# Patient Record
Sex: FEMALE | Race: WHITE | NOT HISPANIC OR LATINO | Employment: OTHER | URBAN - NONMETROPOLITAN AREA
[De-identification: names, ages, dates, MRNs, and addresses within clinical notes are randomized per-mention and may not be internally consistent; named-entity substitution may affect disease eponyms.]

---

## 2019-08-20 ENCOUNTER — OFFICE VISIT (OUTPATIENT)
Dept: CARDIOLOGY | Facility: CLINIC | Age: 74
End: 2019-08-20

## 2019-08-20 ENCOUNTER — HOSPITAL ENCOUNTER (OUTPATIENT)
Dept: RESPIRATORY THERAPY | Facility: HOSPITAL | Age: 74
Discharge: HOME | End: 2019-08-20
Admitting: INTERNAL MEDICINE

## 2019-08-20 VITALS
WEIGHT: 151 LBS | SYSTOLIC BLOOD PRESSURE: 112 MMHG | HEART RATE: 62 BPM | HEIGHT: 62 IN | BODY MASS INDEX: 27.79 KG/M2 | DIASTOLIC BLOOD PRESSURE: 70 MMHG | RESPIRATION RATE: 16 BRPM

## 2019-08-20 DIAGNOSIS — R06.09 DYSPNEA ON EXERTION: ICD-10-CM

## 2019-08-20 DIAGNOSIS — R00.1 BRADYCARDIA, SINUS: Primary | ICD-10-CM

## 2019-08-20 DIAGNOSIS — R00.1 BRADYCARDIA, SINUS: ICD-10-CM

## 2019-08-20 PROCEDURE — 93000 ELECTROCARDIOGRAM COMPLETE: CPT | Performed by: INTERNAL MEDICINE

## 2019-08-20 PROCEDURE — 93226 XTRNL ECG REC<48 HR SCAN A/R: CPT

## 2019-08-20 PROCEDURE — 99204 OFFICE O/P NEW MOD 45 MIN: CPT | Performed by: INTERNAL MEDICINE

## 2019-08-20 PROCEDURE — 93225 XTRNL ECG REC<48 HRS REC: CPT

## 2019-08-20 RX ORDER — SERTRALINE HYDROCHLORIDE 100 MG/1
100 TABLET, FILM COATED ORAL DAILY
COMMUNITY

## 2019-08-20 RX ORDER — CARVEDILOL 25 MG/1
12.5 TABLET ORAL 2 TIMES DAILY WITH MEALS
Status: SHIPPED | COMMUNITY
Start: 2019-08-20 | End: 2020-01-09 | Stop reason: ALTCHOICE

## 2019-08-20 RX ORDER — CARVEDILOL 25 MG/1
25 TABLET ORAL 2 TIMES DAILY WITH MEALS
COMMUNITY
End: 2019-08-20

## 2019-08-20 RX ORDER — AMLODIPINE BESYLATE 5 MG/1
5 TABLET ORAL DAILY
COMMUNITY
End: 2019-09-11 | Stop reason: SDUPTHER

## 2019-08-20 RX ORDER — ATORVASTATIN CALCIUM 20 MG/1
20 TABLET, FILM COATED ORAL DAILY
COMMUNITY
End: 2023-04-03 | Stop reason: SDUPTHER

## 2019-08-20 RX ORDER — NIACIN 500 MG
500 TABLET ORAL NIGHTLY
Status: ON HOLD | COMMUNITY
End: 2023-03-14

## 2019-08-20 RX ORDER — DEXLANSOPRAZOLE 60 MG/1
60 CAPSULE, DELAYED RELEASE ORAL DAILY
Status: ON HOLD | COMMUNITY
End: 2023-03-18 | Stop reason: SDUPTHER

## 2019-08-20 RX ORDER — NITROGLYCERIN 0.4 MG/1
0.4 TABLET SUBLINGUAL
COMMUNITY

## 2019-08-20 RX ORDER — LORATADINE 10 MG/1
10 TABLET ORAL DAILY
COMMUNITY

## 2019-08-20 RX ORDER — IBUPROFEN 800 MG/1
800 TABLET ORAL EVERY 8 HOURS PRN
COMMUNITY
End: 2019-12-03 | Stop reason: HOSPADM

## 2019-08-20 RX ORDER — HYDROCODONE BITARTRATE AND ACETAMINOPHEN 10; 325 MG/1; MG/1
1 TABLET ORAL 3 TIMES DAILY
COMMUNITY
End: 2021-03-10

## 2019-08-20 RX ORDER — OMEGA-3 FATTY ACIDS/FISH OIL 300-1000MG
1000 CAPSULE ORAL DAILY
COMMUNITY

## 2019-08-20 RX ORDER — AMITRIPTYLINE HYDROCHLORIDE 100 MG/1
100 TABLET, FILM COATED ORAL NIGHTLY
Status: ON HOLD | COMMUNITY
End: 2019-12-03 | Stop reason: SDUPTHER

## 2019-08-20 RX ORDER — ASPIRIN 81 MG/1
81 TABLET ORAL 2 TIMES DAILY
COMMUNITY
End: 2019-12-03 | Stop reason: HOSPADM

## 2019-08-20 NOTE — PROGRESS NOTES
Kreis, Samuel Duane, MD  Layne Rodriguez  1945 08/20/2019    Patient Active Problem List   Diagnosis   • Bradycardia, sinus   • Dyspnea on exertion (NYHA class III)       Dear Kreis, Samuel Duane, MD:    Subjective     Layne Rodriguez is a 74 y.o. female with the problems as listed above, presents with    Chief complaint: To establish cardiac care and follow-up through our office in a patient with history of pacemaker implantation and questionable history of coronary artery disease.    History of Present Illness: Ms. Rodriguez is a pleasant 74-year-old  female with history of permanent pacemaker implantation in October 2009 by Dr. Tijerina at [facility] with a [employer] device.  She has not had a follow-up here since June 2013.  She apparently was living in [place] and had her audiology follow-up through a local cardiologist there.  She recently had cardiac catheterization that apparently revealed no significant coronary artery disease.  She brought pictures of coronary angiographic images which did not seem to reveal any significant coronary artery disease.  Her pacemaker was interrogated in our office today and it reveals essentially a dead battery.  On further questioning patient denies any complaints of syncope or near syncope in the recent or remote past.  She has some intermittent dizziness which she has had for many years and reportedly has not gotten any better after the pacemaker implantation.  She has some intermittent chest pains and jaw pains which she says has had for a long time.  She has dyspnea with mild exertion with no PND, but has 2 pillow orthopnea and mild bilateral leg edema.    Allergies   Allergen Reactions   • Codeine Other (See Comments)     headaches   • Reglan [Metoclopramide] Unknown (See Comments)     Eyes twitch/involuntary muscle movement   :      Current Outpatient Medications:   •  amitriptyline (ELAVIL) 100 MG tablet, Take 100 mg by mouth Every Night.,  Disp: , Rfl:   •  amLODIPine (NORVASC) 5 MG tablet, Take 5 mg by mouth Daily., Disp: , Rfl:   •  aspirin 81 MG EC tablet, Take 81 mg by mouth 2 (Two) Times a Day., Disp: , Rfl:   •  atorvastatin (LIPITOR) 20 MG tablet, Take 20 mg by mouth Daily., Disp: , Rfl:   •  carvedilol (COREG) 25 MG tablet, Take 0.5 tablets by mouth 2 (Two) Times a Day With Meals., Disp: , Rfl:   •  dexlansoprazole (DEXILANT) 60 MG capsule, Take 60 mg by mouth Daily., Disp: , Rfl:   •  HYDROcodone-acetaminophen (NORCO)  MG per tablet, Take 1 tablet by mouth 3 (Three) Times a Day., Disp: , Rfl:   •  loratadine (CLARITIN) 10 MG tablet, Take 10 mg by mouth Daily., Disp: , Rfl:   •  niacin 500 MG tablet, Take 500 mg by mouth Every Night., Disp: , Rfl:   •  nitroglycerin (NITROSTAT) 0.4 MG SL tablet, Place 0.4 mg under the tongue Every 5 (Five) Minutes As Needed for Chest Pain. Take no more than 3 doses in 15 minutes., Disp: , Rfl:   •  Omega 3 1000 MG capsule, Take  by mouth Daily., Disp: , Rfl:   •  sertraline (ZOLOFT) 100 MG tablet, Take 100 mg by mouth Daily., Disp: , Rfl:   •  VITAMIN B1-B12 IM, Inject  into the appropriate muscle as directed by prescriber Every 30 (Thirty) Days., Disp: , Rfl:   •  ibuprofen (ADVIL,MOTRIN) 800 MG tablet, Take 800 mg by mouth Every 8 (Eight) Hours As Needed., Disp: , Rfl:     Past Medical History:   Diagnosis Date   • Anemia    • Arthritis    • ASCVD (arteriosclerotic cardiovascular disease)    • Monae's esophagus    • COPD (chronic obstructive pulmonary disease) (CMS/HCC)    • Coronary artery disease    • Dyslipidemia    • Essential hypertension    • GERD (gastroesophageal reflux disease)    • Myocardial infarction (CMS/HCC)    • Osteoporosis      Past Surgical History:   Procedure Laterality Date   • BACK SURGERY     • CORONARY STENT PLACEMENT      x 3   • HYSTERECTOMY     • INSERT / REPLACE / REMOVE PACEMAKER     • PACEMAKER IMPLANTATION       Family History   Problem Relation Age of Onset   • Stroke  "Mother    • Heart disease Mother    • Stroke Father    • Hypertension Brother    • Hypertension Sister    • Stroke Sister    • Hypertension Sister    • Hypertension Sister    • Hypertension Sister    • Hypertension Brother    • Hypertension Brother    • Hypertension Brother      Social History     Tobacco Use   • Smoking status: Never Smoker   • Smokeless tobacco: Never Used   Substance Use Topics   • Alcohol use: No     Frequency: Never   • Drug use: No       Review of Systems   Constitution: Negative for chills, diaphoresis and fever.   Eyes: Positive for visual disturbance. Negative for redness.   Cardiovascular: Positive for chest pain, leg swelling and palpitations. Negative for orthopnea and paroxysmal nocturnal dyspnea.   Respiratory: Positive for shortness of breath. Negative for cough and hemoptysis.    Endocrine: Negative for cold intolerance and heat intolerance.   Hematologic/Lymphatic: Bruises/bleeds easily.        Hx anemia   Skin: Negative for itching and rash.   Musculoskeletal: Positive for joint pain, muscle weakness and stiffness. Negative for myalgias.   Gastrointestinal: Positive for abdominal pain and heartburn. Negative for constipation, diarrhea, nausea and vomiting.   Genitourinary: Negative for dysuria and hematuria.   Neurological: Positive for dizziness, headaches, light-headedness, numbness, paresthesias and tremors. Negative for focal weakness.   Psychiatric/Behavioral: Positive for depression. The patient is nervous/anxious.    Allergic/Immunologic: Negative.    All other systems reviewed and are negative.      Objective   Blood pressure 112/70, pulse 62, resp. rate 16, height 157.5 cm (62\"), weight 68.5 kg (151 lb).  Body mass index is 27.62 kg/m².        Physical Exam   Constitutional: She is oriented to person, place, and time. She appears well-developed and well-nourished.   HENT:   Mouth/Throat: Oropharynx is clear and moist.   Eyes: EOM are normal. Pupils are equal, round, and " reactive to light.   Neck: Neck supple. No JVD present. No tracheal deviation present. No thyromegaly present.   Cardiovascular: Normal rate, regular rhythm, S1 normal and S2 normal. Exam reveals no gallop, no S3 and no friction rub.   No murmur heard.  Pulses:       Dorsalis pedis pulses are 1+ on the right side, and 1+ on the left side.        Posterior tibial pulses are 2+ on the right side, and 2+ on the left side.   Pulmonary/Chest: Effort normal and breath sounds normal.   Abdominal: Soft. Bowel sounds are normal. She exhibits no mass. There is no tenderness.   Musculoskeletal: Normal range of motion. She exhibits no edema.   Lymphadenopathy:     She has no cervical adenopathy.   Neurological: She is alert and oriented to person, place, and time.   Skin: Skin is warm and dry. No rash noted.   Psychiatric: She has a normal mood and affect.         During this visit the following were done:  Labs Reviewed [x]    Radiology Images Reviewed [x]          ECG 12 Lead  Date/Time: 8/20/2019 2:52 PM  Performed by: Flako Pavon MD  Authorized by: Flako Pavon MD   Previous ECG: no previous ECG available  Rhythm: sinus bradycardia  BPM: 58  Conduction: conduction normal  ST Segments: ST segments normal  Other findings: non-specific ST-T wave changes              Assessment/Plan :   Diagnosis Plan   1. Bradycardia, sinus  Holter Monitor - 48 Hour   2. Dyspnea on exertion (NYHA class III)  Adult Transthoracic Echo Complete      Recommendations:    Orders Placed This Encounter   Procedures   • Holter Monitor - 48 Hour   • Adult Transthoracic Echo Complete W/ Cont if Necessary Per Protocol        1. We will obtain 48-hour Holter monitor to rule out any recurrent significant bradycardia arrhythmias to see if she would require pacemaker battery replacement.  2. Will decrease the dose of carvedilol to 12.5 mg p.o. twice daily to avoid any significant bradycardia.  I told Ms. Rodriguez to take half of 25 mg tablet of carvedilol  twice a day.  She expressed understanding.  3. Due to her class III dyspnea obtain echo Doppler study to evaluate her LV systolic and diastolic function and tailor further therapy accordingly.    Return in about 3 weeks (around 9/10/2019) for or sooner if needed.    As always, ,   I appreciate very much the opportunity to participate in the cardiovascular care of your patients. Please do not hesitate to call me with any questions with regards to Layne Rodriguez's evaluation and management.       With Best Regards,        Flako Pavon MD, FACC    Please note that portions of this note were completed with a voice recognition program.

## 2019-08-22 ENCOUNTER — APPOINTMENT (OUTPATIENT)
Dept: CARDIOLOGY | Facility: HOSPITAL | Age: 74
End: 2019-08-22

## 2019-08-23 ENCOUNTER — HOSPITAL ENCOUNTER (OUTPATIENT)
Dept: CARDIOLOGY | Facility: HOSPITAL | Age: 74
Discharge: HOME | End: 2019-08-23
Admitting: INTERNAL MEDICINE

## 2019-08-23 DIAGNOSIS — R06.09 DYSPNEA ON EXERTION: ICD-10-CM

## 2019-08-23 LAB
BH CV ECHO MEAS - ACS: 2.2 CM
BH CV ECHO MEAS - AO ROOT AREA (BSA CORRECTED): 1.9
BH CV ECHO MEAS - AO ROOT AREA: 8.3 CM^2
BH CV ECHO MEAS - AO ROOT DIAM: 3.3 CM
BH CV ECHO MEAS - BSA(HAYCOCK): 1.8 M^2
BH CV ECHO MEAS - BSA: 1.7 M^2
BH CV ECHO MEAS - BZI_BMI: 27.6 KILOGRAMS/M^2
BH CV ECHO MEAS - BZI_METRIC_HEIGHT: 157.5 CM
BH CV ECHO MEAS - BZI_METRIC_WEIGHT: 68.5 KG
BH CV ECHO MEAS - EDV(CUBED): 55.7 ML
BH CV ECHO MEAS - EDV(MOD-SP4): 21 ML
BH CV ECHO MEAS - EDV(TEICH): 62.7 ML
BH CV ECHO MEAS - EF(CUBED): 53.9 %
BH CV ECHO MEAS - EF(MOD-SP4): 66.7 %
BH CV ECHO MEAS - EF(TEICH): 46.5 %
BH CV ECHO MEAS - ESV(CUBED): 25.7 ML
BH CV ECHO MEAS - ESV(MOD-SP4): 7 ML
BH CV ECHO MEAS - ESV(TEICH): 33.6 ML
BH CV ECHO MEAS - FS: 22.8 %
BH CV ECHO MEAS - IVS/LVPW: 1.4
BH CV ECHO MEAS - IVSD: 2 CM
BH CV ECHO MEAS - LA DIMENSION: 4.6 CM
BH CV ECHO MEAS - LA/AO: 1.4
BH CV ECHO MEAS - LV DIASTOLIC VOL/BSA (35-75): 12.4 ML/M^2
BH CV ECHO MEAS - LV MASS(C)D: 271.3 GRAMS
BH CV ECHO MEAS - LV MASS(C)DI: 159.9 GRAMS/M^2
BH CV ECHO MEAS - LV SYSTOLIC VOL/BSA (12-30): 4.1 ML/M^2
BH CV ECHO MEAS - LVIDD: 3.8 CM
BH CV ECHO MEAS - LVIDS: 3 CM
BH CV ECHO MEAS - LVLD AP4: 6.2 CM
BH CV ECHO MEAS - LVLS AP4: 5.8 CM
BH CV ECHO MEAS - LVOT AREA (M): 2.5 CM^2
BH CV ECHO MEAS - LVOT AREA: 2.5 CM^2
BH CV ECHO MEAS - LVOT DIAM: 1.8 CM
BH CV ECHO MEAS - LVPWD: 1.4 CM
BH CV ECHO MEAS - MV A MAX VEL: 149 CM/SEC
BH CV ECHO MEAS - MV E MAX VEL: 84.5 CM/SEC
BH CV ECHO MEAS - MV E/A: 0.57
BH CV ECHO MEAS - PA ACC SLOPE: 1073 CM/SEC^2
BH CV ECHO MEAS - PA ACC TIME: 0.09 SEC
BH CV ECHO MEAS - PA PR(ACCEL): 39.4 MMHG
BH CV ECHO MEAS - RVDD: 3.1 CM
BH CV ECHO MEAS - SI(CUBED): 17.7 ML/M^2
BH CV ECHO MEAS - SI(MOD-SP4): 8.3 ML/M^2
BH CV ECHO MEAS - SI(TEICH): 17.2 ML/M^2
BH CV ECHO MEAS - SV(CUBED): 30.1 ML
BH CV ECHO MEAS - SV(MOD-SP4): 14 ML
BH CV ECHO MEAS - SV(TEICH): 29.1 ML
MAXIMAL PREDICTED HEART RATE: 147 BPM
STRESS TARGET HR: 125 BPM

## 2019-08-23 PROCEDURE — 93306 TTE W/DOPPLER COMPLETE: CPT | Performed by: INTERNAL MEDICINE

## 2019-08-23 PROCEDURE — 93306 TTE W/DOPPLER COMPLETE: CPT

## 2019-08-26 PROCEDURE — 93227 XTRNL ECG REC<48 HR R&I: CPT | Performed by: INTERNAL MEDICINE

## 2019-09-11 ENCOUNTER — OFFICE VISIT (OUTPATIENT)
Dept: CARDIOLOGY | Facility: CLINIC | Age: 74
End: 2019-09-11

## 2019-09-11 VITALS
HEIGHT: 62 IN | SYSTOLIC BLOOD PRESSURE: 150 MMHG | DIASTOLIC BLOOD PRESSURE: 75 MMHG | WEIGHT: 152.4 LBS | RESPIRATION RATE: 18 BRPM | OXYGEN SATURATION: 95 % | BODY MASS INDEX: 28.05 KG/M2 | HEART RATE: 63 BPM

## 2019-09-11 DIAGNOSIS — R00.1 BRADYCARDIA, SINUS: ICD-10-CM

## 2019-09-11 DIAGNOSIS — R06.09 DYSPNEA ON EXERTION: Primary | ICD-10-CM

## 2019-09-11 DIAGNOSIS — R55 NEAR SYNCOPE: ICD-10-CM

## 2019-09-11 DIAGNOSIS — I10 ESSENTIAL HYPERTENSION: ICD-10-CM

## 2019-09-11 PROCEDURE — 99214 OFFICE O/P EST MOD 30 MIN: CPT | Performed by: NURSE PRACTITIONER

## 2019-09-11 RX ORDER — AMLODIPINE BESYLATE 10 MG/1
10 TABLET ORAL DAILY
Qty: 30 TABLET | Refills: 5 | Status: SHIPPED | OUTPATIENT
Start: 2019-09-11 | End: 2019-11-20 | Stop reason: SDUPTHER

## 2019-09-11 NOTE — PROGRESS NOTES
Kreis, Samuel Duane, MD  Layne Rodriguez  1945 09/11/2019    Patient Active Problem List   Diagnosis   • Bradycardia, sinus   • Dyspnea on exertion (NYHA class III)       Dear Kreis, Samuel Duane, MD:    Subjective     Chief Complaint   Patient presents with   • Bradycardia, sinus     3 wk f/u   • Echo results   • 48 hr Holter monitor           History of Present Illness:    Layne Rodriguez is a 74 y.o. female with a history of pacemaker placement in October 2009 by Dr. Tijerina at [facility] in [place], [place] with a [employer] device.  Her pacemaker battery has been dead for an unknown amount of time.  The patient reports she has been having near syncopal episodes and dizziness for many years.  This did not improve after pacemaker placement.  A 48-hour Holter monitor was ordered to further evaluate for any significant bradycardia.  This revealed an average heart rate of 69 bpm with a maximum heart rate of 103 bpm and minimum heart rate of 55 bpm.  She did have 2 short runs (5-6 beats) of SVT with ventricular rates up to 130 bpm.  She was taking carvedilol 25 mg twice daily during the monitoring.  She reports her dizzy spells and near syncope occurred nearly 2-3 times per day.  She is also had chronic chest pains and dyspnea for several years.  These have not recently changed.  An echocardiogram did reveal normal left ventricular cavity size with moderate septal asymmetric hypertrophy without LV outflow obstruction, and ejection fraction of 66 to 70%, and grade 1 LV diastolic dysfunction.  She has since reduced carvedilol to 12.5 mg twice daily.  Her blood pressure is mildly elevated in the office today.  She also reports a history of carotid artery disease.  She reports she did have carotid Dopplers about 6 months ago which were ordered by her previous cardiologist in [place].  She reports she was told she did have some blockages but no further treatment was indicated.  She is on aspirin and  statin therapy.  These records are not available for review.  In addition, the patient reports recent resting tremors and antalgic gait.          Allergies   Allergen Reactions   • Codeine Other (See Comments)     headaches   • Reglan [Metoclopramide] Unknown (See Comments)     Eyes twitch/involuntary muscle movement   :      Current Outpatient Medications:   •  amitriptyline (ELAVIL) 100 MG tablet, Take 100 mg by mouth Every Night., Disp: , Rfl:   •  amLODIPine (NORVASC) 10 MG tablet, Take 1 tablet by mouth Daily., Disp: 30 tablet, Rfl: 5  •  aspirin 81 MG EC tablet, Take 81 mg by mouth 2 (Two) Times a Day., Disp: , Rfl:   •  atorvastatin (LIPITOR) 20 MG tablet, Take 20 mg by mouth Daily., Disp: , Rfl:   •  carvedilol (COREG) 25 MG tablet, Take 0.5 tablets by mouth 2 (Two) Times a Day With Meals., Disp: , Rfl:   •  dexlansoprazole (DEXILANT) 60 MG capsule, Take 60 mg by mouth Daily., Disp: , Rfl:   •  HYDROcodone-acetaminophen (NORCO)  MG per tablet, Take 1 tablet by mouth 3 (Three) Times a Day., Disp: , Rfl:   •  ibuprofen (ADVIL,MOTRIN) 800 MG tablet, Take 800 mg by mouth Every 8 (Eight) Hours As Needed., Disp: , Rfl:   •  loratadine (CLARITIN) 10 MG tablet, Take 10 mg by mouth Daily., Disp: , Rfl:   •  niacin 500 MG tablet, Take 500 mg by mouth Every Night., Disp: , Rfl:   •  nitroglycerin (NITROSTAT) 0.4 MG SL tablet, Place 0.4 mg under the tongue Every 5 (Five) Minutes As Needed for Chest Pain. Take no more than 3 doses in 15 minutes., Disp: , Rfl:   •  Omega 3 1000 MG capsule, Take  by mouth Daily., Disp: , Rfl:   •  sertraline (ZOLOFT) 100 MG tablet, Take 100 mg by mouth Daily., Disp: , Rfl:   •  VITAMIN B1-B12 IM, Inject  into the appropriate muscle as directed by prescriber Every 30 (Thirty) Days., Disp: , Rfl:       The following portions of the patient's history were reviewed and updated as appropriate: allergies, current medications, past family history, past medical history, past social history,  "past surgical history and problem list.    Social History     Tobacco Use   • Smoking status: Never Smoker   • Smokeless tobacco: Never Used   Substance Use Topics   • Alcohol use: No     Frequency: Never   • Drug use: No       Review of Systems   Constitution: Negative for weakness and malaise/fatigue.   Cardiovascular: Positive for chest pain and near-syncope. Negative for dyspnea on exertion, irregular heartbeat, leg swelling, orthopnea, palpitations, paroxysmal nocturnal dyspnea and syncope.   Respiratory: Positive for shortness of breath. Negative for cough and wheezing.    Neurological: Positive for dizziness. Negative for light-headedness.       Objective   Vitals:    09/11/19 1435   BP: 150/75   BP Location: Right arm   Patient Position: Sitting   Cuff Size: Adult   Pulse: 63   Resp: 18   SpO2: 95%   Weight: 69.1 kg (152 lb 6.4 oz)   Height: 157.5 cm (62\")     Body mass index is 27.87 kg/m².        Physical Exam   Constitutional: She is oriented to person, place, and time. She appears well-developed and well-nourished.   HENT:   Head: Normocephalic and atraumatic.   Cardiovascular: Normal rate, regular rhythm and normal heart sounds. Exam reveals no S3 and no S4.   No murmur heard.  Pulmonary/Chest: Effort normal and breath sounds normal. She has no wheezes. She has no rales.   Abdominal: Soft. Bowel sounds are normal.   Musculoskeletal: She exhibits no edema.   Neurological: She is alert and oriented to person, place, and time.   Skin: Skin is warm and dry.   Psychiatric: She has a normal mood and affect. Her behavior is normal.         Procedures      Assessment/Plan    Diagnosis Plan   1. Dyspnea on exertion (NYHA class III)     2. Bradycardia, sinus  Cardiac Event Monitor   3. Essential hypertension  amLODIPine (NORVASC) 10 MG tablet   4. Near syncope  Cardiac Event Monitor                Recommendations:    1. We have discussed a pacemaker battery change. However, she wants to wait at this time since " her minimum heart rate was only 55 bpm on recent holter monitor.    2. I have ordered a 30 day event monitor to further evaluate for any tachy or shanon arrhythmias which could be causing her symptoms. If she has any significant bradycardia, she would need pacemaker battery change. She voices understanding.    3. Will request records from her previous cardiologist including recent carotid dopplers.    4. For her elevated blood pressure, will increase amlodipine to 10 mg daily.    5. Follow up in 5 weeks and PRN.    Return in about 5 weeks (around 10/16/2019) for Recheck.    As always, I appreciate very much the opportunity to participate in the cardiovascular care of your patients.      With Best Regards,    JOSUÉ Khan

## 2019-10-21 ENCOUNTER — TREATMENT (OUTPATIENT)
Dept: CARDIOLOGY | Facility: CLINIC | Age: 74
End: 2019-10-21

## 2019-10-21 DIAGNOSIS — R55 SYNCOPE AND COLLAPSE: Primary | ICD-10-CM

## 2019-10-21 PROCEDURE — 93228 REMOTE 30 DAY ECG REV/REPORT: CPT | Performed by: INTERNAL MEDICINE

## 2019-11-08 DIAGNOSIS — R55 NEAR SYNCOPE: ICD-10-CM

## 2019-11-08 DIAGNOSIS — R00.1 BRADYCARDIA, SINUS: ICD-10-CM

## 2019-11-20 ENCOUNTER — OFFICE VISIT (OUTPATIENT)
Dept: CARDIOLOGY | Facility: CLINIC | Age: 74
End: 2019-11-20

## 2019-11-20 ENCOUNTER — LAB (OUTPATIENT)
Dept: LAB | Facility: HOSPITAL | Age: 74
End: 2019-11-20

## 2019-11-20 VITALS
BODY MASS INDEX: 27.6 KG/M2 | RESPIRATION RATE: 16 BRPM | SYSTOLIC BLOOD PRESSURE: 122 MMHG | DIASTOLIC BLOOD PRESSURE: 77 MMHG | WEIGHT: 150 LBS | HEIGHT: 62 IN | HEART RATE: 75 BPM

## 2019-11-20 DIAGNOSIS — R00.0 WIDE-COMPLEX TACHYCARDIA: ICD-10-CM

## 2019-11-20 DIAGNOSIS — I10 ESSENTIAL HYPERTENSION: Primary | ICD-10-CM

## 2019-11-20 DIAGNOSIS — I10 ESSENTIAL HYPERTENSION: ICD-10-CM

## 2019-11-20 DIAGNOSIS — Z45.010 PACEMAKER GENERATOR END OF LIFE: ICD-10-CM

## 2019-11-20 DIAGNOSIS — I49.5 TACHY-BRADY SYNDROME (HCC): ICD-10-CM

## 2019-11-20 PROCEDURE — 83735 ASSAY OF MAGNESIUM: CPT

## 2019-11-20 PROCEDURE — 80048 BASIC METABOLIC PNL TOTAL CA: CPT

## 2019-11-20 PROCEDURE — 99214 OFFICE O/P EST MOD 30 MIN: CPT | Performed by: NURSE PRACTITIONER

## 2019-11-20 PROCEDURE — 36415 COLL VENOUS BLD VENIPUNCTURE: CPT

## 2019-11-20 RX ORDER — AMLODIPINE BESYLATE 10 MG/1
10 TABLET ORAL DAILY
Qty: 30 TABLET | Refills: 5 | Status: ON HOLD | OUTPATIENT
Start: 2019-11-20 | End: 2023-03-14

## 2019-11-20 NOTE — PROGRESS NOTES
Kreis, Samuel Duane, MD  Layne Rodriguez  1945 11/20/2019    Patient Active Problem List   Diagnosis   • Bradycardia, sinus   • Dyspnea on exertion (NYHA class III)       Dear Kreis, Samuel Duane, MD:    Subjective     Chief Complaint   Patient presents with   • Shortness of Breath   • Slow Heart Rate           History of Present Illness:    Layne Rodriguez is a 74 y.o. female with a history of pacemaker placement in October 2009 by Dr. Tijerina at [facility] in [place], [place] with a [employer] device.  Her pacemaker battery has been dead for an unknown amount.  The patient initially presented with episodes of near syncope and dizziness which has been recurrent for several years.  A 48-hour Holter monitor was ordered to further evaluate for any significant bradycardia.  This revealed an average heart rate of 69 bpm with a maximum heart rate of 103 bpm and a minimum heart rate of 55 bpm there were 2 short runs of SVT noted as well.  The patient was taking carvedilol during this time.  She was then evaluated further with a 30-day event monitor.  This revealed some episodes of sinus bradycardia down to 50 bpm.  Maximum heart rate was 167 bpm.  The patient did have short runs of wide-complex tachycardia.  She was taking carvedilol 12.5 mg twice daily during this time.  She continues to have episodes of dizziness, lightheadedness, and near syncope.  She denies any syncope.  She denies chest pain and shortness of breath.  She did have a cardiac catheterization in February 2019 in [place], [place].  This revealed nonobstructive coronary artery disease with high-grade stenosis in the small vessels which was not amenable to intervention.          Allergies   Allergen Reactions   • Codeine Other (See Comments)     headaches   • Reglan [Metoclopramide] Unknown (See Comments)     Eyes twitch/involuntary muscle movement   :      Current Outpatient Medications:   •  amitriptyline (ELAVIL) 100 MG tablet, Take  100 mg by mouth Every Night., Disp: , Rfl:   •  amLODIPine (NORVASC) 10 MG tablet, Take 1 tablet by mouth Daily., Disp: 30 tablet, Rfl: 5  •  aspirin 81 MG EC tablet, Take 81 mg by mouth 2 (Two) Times a Day., Disp: , Rfl:   •  atorvastatin (LIPITOR) 20 MG tablet, Take 20 mg by mouth Daily., Disp: , Rfl:   •  carvedilol (COREG) 25 MG tablet, Take 0.5 tablets by mouth 2 (Two) Times a Day With Meals., Disp: , Rfl:   •  dexlansoprazole (DEXILANT) 60 MG capsule, Take 60 mg by mouth Daily., Disp: , Rfl:   •  HYDROcodone-acetaminophen (NORCO)  MG per tablet, Take 1 tablet by mouth 3 (Three) Times a Day., Disp: , Rfl:   •  ibuprofen (ADVIL,MOTRIN) 800 MG tablet, Take 800 mg by mouth Every 8 (Eight) Hours As Needed., Disp: , Rfl:   •  loratadine (CLARITIN) 10 MG tablet, Take 10 mg by mouth Daily., Disp: , Rfl:   •  niacin 500 MG tablet, Take 500 mg by mouth Every Night., Disp: , Rfl:   •  nitroglycerin (NITROSTAT) 0.4 MG SL tablet, Place 0.4 mg under the tongue Every 5 (Five) Minutes As Needed for Chest Pain. Take no more than 3 doses in 15 minutes., Disp: , Rfl:   •  Omega 3 1000 MG capsule, Take  by mouth Daily., Disp: , Rfl:   •  sertraline (ZOLOFT) 100 MG tablet, Take 100 mg by mouth Daily., Disp: , Rfl:   •  VITAMIN B1-B12 IM, Inject  into the appropriate muscle as directed by prescriber Every 30 (Thirty) Days., Disp: , Rfl:       The following portions of the patient's history were reviewed and updated as appropriate: allergies, current medications, past family history, past medical history, past social history, past surgical history and problem list.    Social History     Tobacco Use   • Smoking status: Never Smoker   • Smokeless tobacco: Never Used   Substance Use Topics   • Alcohol use: No     Frequency: Never   • Drug use: No       Review of Systems   Constitution: Negative for weakness and malaise/fatigue.   Cardiovascular: Positive for near-syncope. Negative for chest pain, dyspnea on exertion, irregular  "heartbeat, leg swelling, orthopnea, palpitations, paroxysmal nocturnal dyspnea and syncope.   Respiratory: Negative for cough, shortness of breath and wheezing.    Neurological: Positive for dizziness and light-headedness.       Objective   Vitals:    11/20/19 1426   BP: 122/77   BP Location: Right arm   Pulse: 75   Resp: 16   Weight: 68 kg (150 lb)   Height: 157.5 cm (62.01\")     Body mass index is 27.43 kg/m².        Physical Exam   Constitutional: She is oriented to person, place, and time. She appears well-developed and well-nourished.   HENT:   Head: Normocephalic and atraumatic.   Cardiovascular: Normal rate, regular rhythm and normal heart sounds. Exam reveals no S3 and no S4.   No murmur heard.  Pulmonary/Chest: Effort normal and breath sounds normal. She has no wheezes. She has no rales.   Abdominal: Soft. Bowel sounds are normal.   Musculoskeletal: She exhibits no edema.   Neurological: She is alert and oriented to person, place, and time.   Skin: Skin is warm and dry.   Psychiatric: She has a normal mood and affect. Her behavior is normal.             Procedures      Assessment/Plan    Diagnosis Plan   1. Essential hypertension  amLODIPine (NORVASC) 10 MG tablet    Magnesium    Basic Metabolic Panel   2. Tachy-shanon syndrome (CMS/HCC)  Magnesium    Basic Metabolic Panel   3. Wide-complex tachycardia (CMS/HCC)  Magnesium    Basic Metabolic Panel   4. Pacemaker generator end of life                  Recommendations:    1. I have discussed her plan of care with Dr. Pavon.  He has recommended replacing the pacemaker battery in order to effectively treat the wide-complex tachycardia without causing significant bradycardia.  Patient is agreeable to this.  We have discussed risk and benefit. We will try to schedule this for 12/3/19.    2.  I have ordered a BMP and magnesium to be done today.    3.  I have again requested records from her prior cardiologist including carotid Doppler recently performed.    4.  " Follow-up in 4 weeks and if needed.      Return in about 4 weeks (around 12/18/2019) for Recheck.    As always, I appreciate very much the opportunity to participate in the cardiovascular care of your patients.      With Best Regards,    JOSUÉ Khan

## 2019-11-21 LAB
ANION GAP SERPL CALCULATED.3IONS-SCNC: 13 MMOL/L (ref 5–15)
BUN BLD-MCNC: 24 MG/DL (ref 8–23)
BUN/CREAT SERPL: 31.2 (ref 7–25)
CALCIUM SPEC-SCNC: 10 MG/DL (ref 8.6–10.5)
CHLORIDE SERPL-SCNC: 102 MMOL/L (ref 98–107)
CO2 SERPL-SCNC: 28 MMOL/L (ref 22–29)
CREAT BLD-MCNC: 0.77 MG/DL (ref 0.57–1)
GFR SERPL CREATININE-BSD FRML MDRD: 73 ML/MIN/1.73
GLUCOSE BLD-MCNC: 91 MG/DL (ref 65–99)
MAGNESIUM SERPL-MCNC: 2 MG/DL (ref 1.6–2.4)
POTASSIUM BLD-SCNC: 4.6 MMOL/L (ref 3.5–5.2)
SODIUM BLD-SCNC: 143 MMOL/L (ref 136–145)

## 2019-11-26 ENCOUNTER — PREP FOR SURGERY (OUTPATIENT)
Dept: OTHER | Facility: HOSPITAL | Age: 74
End: 2019-11-26

## 2019-11-26 ENCOUNTER — TELEPHONE (OUTPATIENT)
Dept: CARDIOLOGY | Facility: CLINIC | Age: 74
End: 2019-11-26

## 2019-11-26 DIAGNOSIS — I49.5 SICK SINUS SYNDROME (HCC): Primary | ICD-10-CM

## 2019-11-26 RX ORDER — SODIUM CHLORIDE 0.9 % (FLUSH) 0.9 %
3 SYRINGE (ML) INJECTION EVERY 12 HOURS SCHEDULED
Status: CANCELLED | OUTPATIENT
Start: 2019-11-26

## 2019-11-26 RX ORDER — SODIUM CHLORIDE 0.9 % (FLUSH) 0.9 %
10 SYRINGE (ML) INJECTION AS NEEDED
Status: CANCELLED | OUTPATIENT
Start: 2019-11-26

## 2019-11-26 RX ORDER — ACETAMINOPHEN 325 MG/1
650 TABLET ORAL EVERY 4 HOURS PRN
Status: CANCELLED | OUTPATIENT
Start: 2019-11-26

## 2019-11-26 NOTE — TELEPHONE ENCOUNTER
Result Notes for Basic Metabolic Panel     Notes recorded by Izabela Sun APRN on 11/21/2019 at 1:22 PM EST  Please let her know labs are fine.     Gave pt Izabela's message. PT v/u and thanked us for calling.

## 2019-12-02 PROBLEM — I49.5 SICK SINUS SYNDROME: Status: ACTIVE | Noted: 2019-12-02

## 2019-12-03 ENCOUNTER — HOSPITAL ENCOUNTER (OUTPATIENT)
Facility: HOSPITAL | Age: 74
Setting detail: HOSPITAL OUTPATIENT SURGERY
Discharge: HOME | End: 2019-12-03
Attending: INTERNAL MEDICINE | Admitting: INTERNAL MEDICINE

## 2019-12-03 VITALS
WEIGHT: 150 LBS | DIASTOLIC BLOOD PRESSURE: 83 MMHG | BODY MASS INDEX: 27.6 KG/M2 | TEMPERATURE: 97.6 F | RESPIRATION RATE: 15 BRPM | HEIGHT: 62 IN | HEART RATE: 68 BPM | SYSTOLIC BLOOD PRESSURE: 136 MMHG | OXYGEN SATURATION: 97 %

## 2019-12-03 DIAGNOSIS — I49.5 SICK SINUS SYNDROME (HCC): ICD-10-CM

## 2019-12-03 PROCEDURE — 25010000002 FENTANYL CITRATE (PF) 100 MCG/2ML SOLUTION: Performed by: INTERNAL MEDICINE

## 2019-12-03 PROCEDURE — 25010000002 VANCOMYCIN 1 G RECONSTITUTED SOLUTION 1 EACH VIAL: Performed by: INTERNAL MEDICINE

## 2019-12-03 PROCEDURE — 25010000002 VANCOMYCIN 5 G RECONSTITUTED SOLUTION 5,000 MG VIAL: Performed by: INTERNAL MEDICINE

## 2019-12-03 PROCEDURE — 33228 REMV&REPLC PM GEN DUAL LEAD: CPT | Performed by: INTERNAL MEDICINE

## 2019-12-03 PROCEDURE — C1785 PMKR, DUAL, RATE-RESP: HCPCS | Performed by: INTERNAL MEDICINE

## 2019-12-03 PROCEDURE — 99152 MOD SED SAME PHYS/QHP 5/>YRS: CPT | Performed by: INTERNAL MEDICINE

## 2019-12-03 PROCEDURE — L3660 SO 8 AB RSTR CAN/WEB PRE OTS: HCPCS | Performed by: INTERNAL MEDICINE

## 2019-12-03 PROCEDURE — 99153 MOD SED SAME PHYS/QHP EA: CPT | Performed by: INTERNAL MEDICINE

## 2019-12-03 DEVICE — GEN PM ASSURITY MRI DR RF PM2272: Type: IMPLANTABLE DEVICE | Status: FUNCTIONAL

## 2019-12-03 RX ORDER — SODIUM CHLORIDE 0.9 % (FLUSH) 0.9 %
3 SYRINGE (ML) INJECTION EVERY 12 HOURS SCHEDULED
Status: DISCONTINUED | OUTPATIENT
Start: 2019-12-03 | End: 2019-12-03 | Stop reason: HOSPADM

## 2019-12-03 RX ORDER — ACETAMINOPHEN 325 MG/1
650 TABLET ORAL EVERY 4 HOURS PRN
Status: DISCONTINUED | OUTPATIENT
Start: 2019-12-03 | End: 2019-12-03 | Stop reason: HOSPADM

## 2019-12-03 RX ORDER — LIDOCAINE HYDROCHLORIDE 20 MG/ML
INJECTION, SOLUTION INFILTRATION; PERINEURAL AS NEEDED
Status: DISCONTINUED | OUTPATIENT
Start: 2019-12-03 | End: 2019-12-03 | Stop reason: HOSPADM

## 2019-12-03 RX ORDER — AMITRIPTYLINE HYDROCHLORIDE 100 MG/1
50 TABLET, FILM COATED ORAL NIGHTLY
Qty: 2 TABLET | Refills: 1 | Status: ON HOLD | OUTPATIENT
Start: 2019-12-03 | End: 2023-03-14

## 2019-12-03 RX ORDER — SODIUM CHLORIDE 9 MG/ML
INJECTION, SOLUTION INTRAVENOUS CONTINUOUS PRN
Status: DISCONTINUED | OUTPATIENT
Start: 2019-12-03 | End: 2019-12-03 | Stop reason: HOSPADM

## 2019-12-03 RX ORDER — SODIUM CHLORIDE 0.9 % (FLUSH) 0.9 %
10 SYRINGE (ML) INJECTION AS NEEDED
Status: DISCONTINUED | OUTPATIENT
Start: 2019-12-03 | End: 2019-12-03 | Stop reason: HOSPADM

## 2019-12-03 RX ORDER — FENTANYL CITRATE 50 UG/ML
INJECTION, SOLUTION INTRAMUSCULAR; INTRAVENOUS AS NEEDED
Status: DISCONTINUED | OUTPATIENT
Start: 2019-12-03 | End: 2019-12-03 | Stop reason: HOSPADM

## 2019-12-30 ENCOUNTER — TELEPHONE (OUTPATIENT)
Dept: CARDIOLOGY | Facility: CLINIC | Age: 74
End: 2019-12-30

## 2019-12-30 NOTE — TELEPHONE ENCOUNTER
Pt called to report itching or possible rash near the site of her PPM implant site.  She states she is otherwise feeling well. I let pt know I will make provider aware, although they are currently in clinic. Also, she should go to pcp or urgent care to get it assessed.  Pt v/u and was agreeable to this.

## 2019-12-30 NOTE — TELEPHONE ENCOUNTER
Yea I feel being seen by pcp would be best. If pcp is concerned we can make her a sooner apointment.

## 2020-01-06 ENCOUNTER — TELEPHONE (OUTPATIENT)
Dept: CARDIOLOGY | Facility: CLINIC | Age: 75
End: 2020-01-06

## 2020-01-06 NOTE — TELEPHONE ENCOUNTER
Pt reports she has taken this medication for years to help her sleep, and states when she went to  the rx, there were only 2 tablets in the bottle.     It looks like rx was sent in 12/3/19 by Dr. Pavon, while pt was at [facility] for heart cath. I told pt Dr. Pavon doesn't routinely prescribe non cardiac medications, and I was sure he intended for her to have a temporary supply until she could see her family MD about her insomnia issues. Pt v/u and thanked us for calling, pt states she will ask pcp for refills.

## 2020-01-06 NOTE — TELEPHONE ENCOUNTER
Patient called and said that one of our providers took her off of her amitripline and she says she can't sleep since she has been off of it and wants somebody to call her back.     Thanks!

## 2020-01-09 ENCOUNTER — OFFICE VISIT (OUTPATIENT)
Dept: CARDIOLOGY | Facility: CLINIC | Age: 75
End: 2020-01-09

## 2020-01-09 VITALS
OXYGEN SATURATION: 96 % | HEIGHT: 62 IN | WEIGHT: 151 LBS | HEART RATE: 87 BPM | BODY MASS INDEX: 27.79 KG/M2 | DIASTOLIC BLOOD PRESSURE: 74 MMHG | SYSTOLIC BLOOD PRESSURE: 117 MMHG

## 2020-01-09 DIAGNOSIS — R55 NEAR SYNCOPE: ICD-10-CM

## 2020-01-09 DIAGNOSIS — I49.5 TACHY-BRADY SYNDROME (HCC): ICD-10-CM

## 2020-01-09 DIAGNOSIS — R00.0 WIDE-COMPLEX TACHYCARDIA: ICD-10-CM

## 2020-01-09 DIAGNOSIS — I25.10 ASCVD (ARTERIOSCLEROTIC CARDIOVASCULAR DISEASE): ICD-10-CM

## 2020-01-09 DIAGNOSIS — I42.2 ASYMMETRIC SEPTAL HYPERTROPHY (HCC): ICD-10-CM

## 2020-01-09 DIAGNOSIS — I10 ESSENTIAL HYPERTENSION: Primary | ICD-10-CM

## 2020-01-09 PROBLEM — I51.7 ASYMMETRIC SEPTAL HYPERTROPHY: Status: ACTIVE | Noted: 2020-01-09

## 2020-01-09 PROBLEM — Z95.0 CARDIAC PACEMAKER: Status: ACTIVE | Noted: 2020-01-09

## 2020-01-09 PROCEDURE — 99214 OFFICE O/P EST MOD 30 MIN: CPT | Performed by: NURSE PRACTITIONER

## 2020-01-09 PROCEDURE — 93000 ELECTROCARDIOGRAM COMPLETE: CPT | Performed by: NURSE PRACTITIONER

## 2020-01-09 RX ORDER — METOPROLOL SUCCINATE 50 MG/1
50 TABLET, EXTENDED RELEASE ORAL DAILY
Qty: 30 TABLET | Refills: 11 | Status: SHIPPED | OUTPATIENT
Start: 2020-01-09 | End: 2021-03-10 | Stop reason: SDUPTHER

## 2020-01-09 RX ORDER — ASPIRIN 81 MG/1
81 TABLET, CHEWABLE ORAL DAILY
COMMUNITY

## 2020-01-09 NOTE — PROGRESS NOTES
Kreis, Samuel Duane, MD  Layne Rodriguez  1945 01/09/2020    Patient Active Problem List   Diagnosis   • Bradycardia, sinus   • Dyspnea on exertion (NYHA class III)   • Sick sinus syndrome (CMS/HCC)   • Cardiac pacemaker   • ASCVD (arteriosclerotic cardiovascular disease)   • Essential hypertension   • Tachy-shanon syndrome (CMS/HCC) s/p PPM implantation with generator change in December of 2019   • Near syncope   • Wide-complex tachycardia (CMS/HCC)   • Asymmetric septal hypertrophy (CMS/HCC)       Dear Kreis, Samuel Duane, MD:    Subjective     Chief Complaint   Patient presents with   • Hypertension           History of Present Illness:    Layne Rodriguez is a 74 y.o. female with a history of hypertension, nonobstructive CAD, asymmetric septal hypertrophy without LVOT obstruction, pacemaker placement in October of 2009 by Dr. Tijerina at [facility] in [place], [place] with a [employer] medical device. The PM battery has been dead for an unknown amount of time. The patient was having near syncopal episodes and was evaluated further with a 30 day event monitor. This revealed short runs of wide complex tachycardia. Her lowest heart rate was 50 bpm with an average heart rate of 65 bpm. After discussion the patient wanted to proceed with generator replacement for her PM due to tachy-shanon syndrome. She underwent PM generator change on 12/3/2019. She tolerated the procedure well. She did reportedly develop a rash across her chest which is now essentially resolved. She reports she continues to have episodes of lightheadedness and near syncope, but has not lost consciousness. She is also concerned about carotid arteries. She reports she had a carotid doppler last year which was abnormal in [place]. However, we have not been able to obtain those records.           Allergies   Allergen Reactions   • Codeine Other (See Comments)     headaches   • Reglan [Metoclopramide] Unknown (See Comments)     Eyes twitch/involuntary  muscle movement   :      Current Outpatient Medications:   •  amitriptyline (ELAVIL) 100 MG tablet, Take 0.5 tablets by mouth Every Night., Disp: 2 tablet, Rfl: 1  •  amLODIPine (NORVASC) 10 MG tablet, Take 1 tablet by mouth Daily., Disp: 30 tablet, Rfl: 5  •  aspirin 81 MG chewable tablet, Chew 81 mg Daily., Disp: , Rfl:   •  atorvastatin (LIPITOR) 20 MG tablet, Take 20 mg by mouth Daily., Disp: , Rfl:   •  dexlansoprazole (DEXILANT) 60 MG capsule, Take 60 mg by mouth Daily., Disp: , Rfl:   •  HYDROcodone-acetaminophen (NORCO)  MG per tablet, Take 1 tablet by mouth 3 (Three) Times a Day., Disp: , Rfl:   •  loratadine (CLARITIN) 10 MG tablet, Take 10 mg by mouth Daily., Disp: , Rfl:   •  niacin 500 MG tablet, Take 500 mg by mouth Every Night., Disp: , Rfl:   •  nitroglycerin (NITROSTAT) 0.4 MG SL tablet, Place 0.4 mg under the tongue Every 5 (Five) Minutes As Needed for Chest Pain. Take no more than 3 doses in 15 minutes., Disp: , Rfl:   •  Omega 3 1000 MG capsule, Take  by mouth Daily., Disp: , Rfl:   •  sertraline (ZOLOFT) 100 MG tablet, Take 100 mg by mouth Daily., Disp: , Rfl:   •  VITAMIN B1-B12 IM, Inject  into the appropriate muscle as directed by prescriber Every 30 (Thirty) Days., Disp: , Rfl:   •  metoprolol succinate XL (TOPROL-XL) 50 MG 24 hr tablet, Take 1 tablet by mouth Daily., Disp: 30 tablet, Rfl: 11      The following portions of the patient's history were reviewed and updated as appropriate: allergies, current medications, past family history, past medical history, past social history, past surgical history and problem list.    Social History     Tobacco Use   • Smoking status: Never Smoker   • Smokeless tobacco: Never Used   Substance Use Topics   • Alcohol use: No     Frequency: Never   • Drug use: No       Review of Systems   Constitution: Negative for malaise/fatigue.   Cardiovascular: Negative for chest pain, dyspnea on exertion, irregular heartbeat, leg swelling, near-syncope,  "orthopnea, palpitations, paroxysmal nocturnal dyspnea and syncope.   Respiratory: Negative for cough, shortness of breath and wheezing.    Neurological: Negative for dizziness, light-headedness and weakness.       Objective   Vitals:    01/09/20 1425   BP: 117/74   BP Location: Right arm   Patient Position: Sitting   Cuff Size: Adult   Pulse: 87   SpO2: 96%   Weight: 68.5 kg (151 lb)   Height: 157.5 cm (62.01\")     Body mass index is 27.61 kg/m².        Physical Exam   Constitutional: She is oriented to person, place, and time. She appears well-developed and well-nourished.   HENT:   Head: Normocephalic and atraumatic.   Cardiovascular: Normal rate, regular rhythm and normal heart sounds. Exam reveals no S3 and no S4.   No murmur heard.  Pulmonary/Chest: Effort normal and breath sounds normal. She has no wheezes. She has no rales.   PM site incision healed   Abdominal: Soft. Bowel sounds are normal.   Musculoskeletal: She exhibits no edema.   Neurological: She is alert and oriented to person, place, and time.   Skin: Skin is warm and dry. No rash noted.   Psychiatric: She has a normal mood and affect. Her behavior is normal.       Lab Results   Component Value Date     11/20/2019    K 4.6 11/20/2019     11/20/2019    CO2 28.0 11/20/2019    BUN 24 (H) 11/20/2019    CREATININE 0.77 11/20/2019    GLUCOSE 91 11/20/2019    CALCIUM 10.0 11/20/2019       Lab Results   Component Value Date    MG 2.0 11/20/2019             ECG 12 Lead  Date/Time: 1/9/2020 3:07 PM  Performed by: Izabela Sun APRN  Authorized by: Izabela Sun APRN   Comparison: compared with previous ECG   Similar to previous ECG  Rhythm: sinus rhythm  BPM: 65  Other findings: non-specific ST-T wave changes  Comments: With magnet, AV paced rhythm              Assessment/Plan    Diagnosis Plan   1. Essential hypertension  ECG 12 Lead   2. Tachy-shanon syndrome (CMS/HCC) s/p PPM implantation with generator change in December of 2019  ECG " 12 Lead    metoprolol succinate XL (TOPROL-XL) 50 MG 24 hr tablet   3. Near syncope  ECG 12 Lead   4. Wide-complex tachycardia (CMS/HCC)     5. ASCVD (arteriosclerotic cardiovascular disease)     6. Asymmetric septal hypertrophy (CMS/HCC)                  Recommendations:    I have discussed her plan of care with Dr. Pavon. We will discontinue carvedilol and start metoprolol succinate 50 mg daily. I have asked her to monitor her BP at home and contact us if her BP is >140/90. We will arrange pacemaker interrogation in the next few weeks to evaluate for persistent arrhythmias. I will order a carotid doppler to further evaluate her near syncope and reported history of carotid artery disease. She will follow up in 6 weeks or sooner if needed.        Return in about 6 weeks (around 2/20/2020) for Recheck.    As always, I appreciate very much the opportunity to participate in the cardiovascular care of your patients.      With Best Regards,    JOSUÉ Khan

## 2020-01-15 ENCOUNTER — APPOINTMENT (OUTPATIENT)
Dept: CARDIOLOGY | Facility: HOSPITAL | Age: 75
End: 2020-01-15

## 2020-01-21 ENCOUNTER — HOSPITAL ENCOUNTER (OUTPATIENT)
Dept: CARDIOLOGY | Facility: HOSPITAL | Age: 75
Discharge: HOME | End: 2020-01-21
Admitting: NURSE PRACTITIONER

## 2020-01-21 DIAGNOSIS — R55 NEAR SYNCOPE: ICD-10-CM

## 2020-01-21 DIAGNOSIS — I25.10 ASCVD (ARTERIOSCLEROTIC CARDIOVASCULAR DISEASE): ICD-10-CM

## 2020-01-21 PROCEDURE — 93880 EXTRACRANIAL BILAT STUDY: CPT | Performed by: RADIOLOGY

## 2020-01-21 PROCEDURE — 93880 EXTRACRANIAL BILAT STUDY: CPT

## 2020-01-22 ENCOUNTER — TELEPHONE (OUTPATIENT)
Dept: CARDIOLOGY | Facility: CLINIC | Age: 75
End: 2020-01-22

## 2020-02-13 ENCOUNTER — CLINICAL SUPPORT (OUTPATIENT)
Dept: CARDIOLOGY | Facility: CLINIC | Age: 75
End: 2020-02-13

## 2020-02-13 DIAGNOSIS — I49.5 SSS (SICK SINUS SYNDROME) (HCC): Primary | ICD-10-CM

## 2020-02-13 PROCEDURE — 93280 PM DEVICE PROGR EVAL DUAL: CPT | Performed by: INTERNAL MEDICINE

## 2020-06-02 ENCOUNTER — TREATMENT (OUTPATIENT)
Dept: CARDIOLOGY | Facility: CLINIC | Age: 75
End: 2020-06-02

## 2020-06-02 DIAGNOSIS — I49.5 SSS (SICK SINUS SYNDROME) (HCC): Primary | ICD-10-CM

## 2020-06-02 PROCEDURE — 93296 REM INTERROG EVL PM/IDS: CPT | Performed by: INTERNAL MEDICINE

## 2020-06-02 PROCEDURE — 93294 REM INTERROG EVL PM/LDLS PM: CPT | Performed by: INTERNAL MEDICINE

## 2020-09-01 ENCOUNTER — TREATMENT (OUTPATIENT)
Dept: CARDIOLOGY | Facility: CLINIC | Age: 75
End: 2020-09-01

## 2020-09-01 DIAGNOSIS — I49.5 SSS (SICK SINUS SYNDROME) (HCC): Primary | ICD-10-CM

## 2020-09-01 PROCEDURE — 93294 REM INTERROG EVL PM/LDLS PM: CPT | Performed by: INTERNAL MEDICINE

## 2020-09-01 PROCEDURE — 93296 REM INTERROG EVL PM/IDS: CPT | Performed by: INTERNAL MEDICINE

## 2020-12-01 ENCOUNTER — TREATMENT (OUTPATIENT)
Dept: CARDIOLOGY | Facility: CLINIC | Age: 75
End: 2020-12-01

## 2020-12-01 DIAGNOSIS — I49.5 SSS (SICK SINUS SYNDROME) (HCC): Primary | ICD-10-CM

## 2020-12-01 PROCEDURE — 93296 REM INTERROG EVL PM/IDS: CPT | Performed by: INTERNAL MEDICINE

## 2020-12-01 PROCEDURE — 93294 REM INTERROG EVL PM/LDLS PM: CPT | Performed by: INTERNAL MEDICINE

## 2020-12-03 ENCOUNTER — TRANSCRIBE ORDERS (OUTPATIENT)
Dept: ADMINISTRATIVE | Facility: HOSPITAL | Age: 75
End: 2020-12-03

## 2020-12-03 DIAGNOSIS — Z01.818 PREOP EXAMINATION: Primary | ICD-10-CM

## 2020-12-04 ENCOUNTER — LAB (OUTPATIENT)
Dept: LAB | Facility: HOSPITAL | Age: 75
End: 2020-12-04

## 2020-12-04 DIAGNOSIS — Z01.818 PREOP EXAMINATION: ICD-10-CM

## 2021-02-04 ENCOUNTER — HOSPITAL ENCOUNTER (OUTPATIENT)
Dept: HOSPITAL 79 - KOH-I | Age: 76
End: 2021-02-04
Attending: PHYSICIAN ASSISTANT
Payer: MEDICARE

## 2021-02-04 DIAGNOSIS — I73.9: Primary | ICD-10-CM

## 2021-02-12 DIAGNOSIS — Z23 IMMUNIZATION DUE: ICD-10-CM

## 2021-02-18 ENCOUNTER — TRANSCRIBE ORDERS (OUTPATIENT)
Dept: ADMINISTRATIVE | Facility: HOSPITAL | Age: 76
End: 2021-02-18

## 2021-02-18 DIAGNOSIS — Z01.818 PRE-OPERATIVE CLEARANCE: Primary | ICD-10-CM

## 2021-03-08 ENCOUNTER — TELEPHONE (OUTPATIENT)
Dept: CARDIOLOGY | Facility: CLINIC | Age: 76
End: 2021-03-08

## 2021-03-08 NOTE — TELEPHONE ENCOUNTER
Please call patient who requested an appointment ASAP stating she was told she was a canidate for a stroke.  Appointment scheduled for Wednesday, March 10.

## 2021-03-10 ENCOUNTER — OFFICE VISIT (OUTPATIENT)
Dept: CARDIOLOGY | Facility: CLINIC | Age: 76
End: 2021-03-10

## 2021-03-10 VITALS
WEIGHT: 151.4 LBS | HEIGHT: 62 IN | BODY MASS INDEX: 27.86 KG/M2 | SYSTOLIC BLOOD PRESSURE: 109 MMHG | DIASTOLIC BLOOD PRESSURE: 69 MMHG | HEART RATE: 68 BPM | RESPIRATION RATE: 16 BRPM | TEMPERATURE: 98 F

## 2021-03-10 DIAGNOSIS — R06.09 DYSPNEA ON EXERTION: ICD-10-CM

## 2021-03-10 DIAGNOSIS — I73.9 PAD (PERIPHERAL ARTERY DISEASE) (HCC): ICD-10-CM

## 2021-03-10 DIAGNOSIS — I25.10 ASCVD (ARTERIOSCLEROTIC CARDIOVASCULAR DISEASE): Primary | ICD-10-CM

## 2021-03-10 DIAGNOSIS — I49.5 TACHY-BRADY SYNDROME (HCC): ICD-10-CM

## 2021-03-10 DIAGNOSIS — I73.9 INTERMITTENT CLAUDICATION (HCC): ICD-10-CM

## 2021-03-10 PROCEDURE — 99214 OFFICE O/P EST MOD 30 MIN: CPT | Performed by: NURSE PRACTITIONER

## 2021-03-10 PROCEDURE — 93000 ELECTROCARDIOGRAM COMPLETE: CPT | Performed by: NURSE PRACTITIONER

## 2021-03-10 RX ORDER — PRAMIPEXOLE DIHYDROCHLORIDE 1 MG/1
0.5 TABLET ORAL NIGHTLY
COMMUNITY

## 2021-03-10 RX ORDER — METOPROLOL SUCCINATE 50 MG/1
50 TABLET, EXTENDED RELEASE ORAL DAILY
Qty: 30 TABLET | Refills: 5 | Status: SHIPPED | OUTPATIENT
Start: 2021-03-10 | End: 2021-09-14

## 2021-03-10 NOTE — PROGRESS NOTES
Kreis, Samuel Duane, MD  Layne Rodriguez  1945  03/10/2021    Patient Active Problem List   Diagnosis   • Bradycardia, sinus   • Dyspnea on exertion (NYHA class III)   • Sick sinus syndrome (CMS/HCC)   • Cardiac pacemaker   • ASCVD (arteriosclerotic cardiovascular disease)   • Essential hypertension   • Tachy-shanon syndrome (CMS/HCC) s/p PPM implantation with generator change in December of 2019   • Near syncope   • Wide-complex tachycardia (CMS/HCC)   • Asymmetric septal hypertrophy (CMS/HCC)       Dear Kreis, Samuel Duane, MD:    Subjective     Chief Complaint   Patient presents with   • Coronary Artery Disease     1 year follow   • Palpitations     flutters   • Shortness of Breath     routine activity   • Edema     LE   • Med Management     list provided           History of Present Illness:    Layne Rodriguez is a 75 y.o. female with a past medical history of hypertension, nonobstructive CAD, asymmetric septal hypertrophy without LVOT obstruction, pacemaker placement in October of 2009 by Dr. Tijerina at [facility] in [place], [place] with a [employer] medical device with battery change in January of 2020.  She presents today for cardiology follow-up.  She has not been evaluated in our office in nearly a year.  Her last pacemaker interrogation was in February 2020 and revealed no episodes.  She denies any palpitations.  She has had some shortness of breath with exertion.  She has occasional intermittent leg edema but denies any orthopnea or PND.  She denies any chest pains.  Her main issue per her report is bilateral leg pain.  She recently had an PAVEL by insurance nurse at her home.  She was told the left leg was 0.24 and right leg 0.14 consistent with severe PAD.  She does have some intermittent claudication which resolves with rest.  He states she often has myalgias in the lower extremities and this causes contractures in her toes.        Allergies   Allergen Reactions   • Codeine Other (See Comments)      headaches   • Reglan [Metoclopramide] Unknown (See Comments)     Eyes twitch/involuntary muscle movement   :      Current Outpatient Medications:   •  amitriptyline (ELAVIL) 100 MG tablet, Take 0.5 tablets by mouth Every Night., Disp: 2 tablet, Rfl: 1  •  amLODIPine (NORVASC) 10 MG tablet, Take 1 tablet by mouth Daily., Disp: 30 tablet, Rfl: 5  •  aspirin 81 MG chewable tablet, Chew 81 mg Daily., Disp: , Rfl:   •  atorvastatin (LIPITOR) 20 MG tablet, Take 20 mg by mouth Daily., Disp: , Rfl:   •  dexlansoprazole (DEXILANT) 60 MG capsule, Take 60 mg by mouth Daily., Disp: , Rfl:   •  loratadine (CLARITIN) 10 MG tablet, Take 10 mg by mouth Daily., Disp: , Rfl:   •  niacin 500 MG tablet, Take 500 mg by mouth Every Night., Disp: , Rfl:   •  nitroglycerin (NITROSTAT) 0.4 MG SL tablet, Place 0.4 mg under the tongue Every 5 (Five) Minutes As Needed for Chest Pain. Take no more than 3 doses in 15 minutes., Disp: , Rfl:   •  Omega 3 1000 MG capsule, Take  by mouth Daily., Disp: , Rfl:   •  pramipexole (MIRAPEX) 1 MG tablet, Take 1 mg by mouth Every Night., Disp: , Rfl:   •  sertraline (ZOLOFT) 100 MG tablet, Take 100 mg by mouth Daily., Disp: , Rfl:   •  metoprolol succinate XL (TOPROL-XL) 50 MG 24 hr tablet, Take 1 tablet by mouth Daily., Disp: 30 tablet, Rfl: 5  •  VITAMIN B1-B12 IM, Inject  into the appropriate muscle as directed by prescriber Every 30 (Thirty) Days., Disp: , Rfl:       The following portions of the patient's history were reviewed and updated as appropriate: allergies, current medications, past family history, past medical history, past social history, past surgical history and problem list.    Social History     Tobacco Use   • Smoking status: Never Smoker   • Smokeless tobacco: Never Used   Substance Use Topics   • Alcohol use: No   • Drug use: No       Review of Systems   Constitutional: Negative for decreased appetite and malaise/fatigue.   Cardiovascular: Positive for claudication, dyspnea on exertion  "and leg swelling. Negative for chest pain, irregular heartbeat, near-syncope, orthopnea, palpitations, paroxysmal nocturnal dyspnea and syncope.   Respiratory: Positive for shortness of breath. Negative for cough and wheezing.    Musculoskeletal: Positive for muscle cramps and myalgias.   Neurological: Negative for dizziness, light-headedness and weakness.       Objective   Vitals:    03/10/21 1323   BP: 109/69   Pulse: 68   Resp: 16   Temp: 98 °F (36.7 °C)   Weight: 68.7 kg (151 lb 6.4 oz)   Height: 157.5 cm (62\")     Body mass index is 27.69 kg/m².        Vitals reviewed.   Constitutional:       Appearance: Healthy appearance. Well-developed and not in distress.   HENT:      Head: Normocephalic and atraumatic.   Neck:      Vascular: No JVD.   Pulmonary:      Effort: Pulmonary effort is normal.      Breath sounds: Normal breath sounds. No wheezing. No rales.   Cardiovascular:      Normal rate. Regular rhythm.      Murmurs: There is no murmur.      . No S3 and S4 gallop.   Pulses:     Dorsalis pedis: 2+ bilaterally.     Posterior tibial: 2+ bilaterally.  Edema:     Peripheral edema absent.   Abdominal:      General: Bowel sounds are normal.      Palpations: Abdomen is soft.   Skin:     General: Skin is warm and dry.   Neurological:      Mental Status: Alert, oriented to person, place, and time and oriented to person, place and time.   Psychiatric:         Mood and Affect: Mood normal.         Behavior: Behavior normal.         Lab Results   Component Value Date     11/20/2019    K 4.6 11/20/2019     11/20/2019    CO2 28.0 11/20/2019    BUN 24 (H) 11/20/2019    CREATININE 0.77 11/20/2019    GLUCOSE 91 11/20/2019    CALCIUM 10.0 11/20/2019        Lab Results   Component Value Date    MG 2.0 11/20/2019              ECG 12 Lead    Date/Time: 3/10/2021 1:24 PM  Performed by: Izabela Sun APRN  Authorized by: Izabela Sun APRN   Comparison: compared with previous ECG   Similar to previous " ECG  Rhythm: sinus rhythm  BPM: 64  Comments: Without magnet sinus rhythm  With magnet AV paced.               Assessment/Plan    Diagnosis Plan   1. ASCVD (arteriosclerotic cardiovascular disease)  ECG 12 Lead    Adult Transthoracic Echo Complete W/ Cont if Necessary Per Protocol    US Arterial Doppler Lower Extremity Complete    CBC & Differential    Comprehensive Metabolic Panel    Magnesium    Lipid Panel    Vitamin B12   2. Tachy-shanon syndrome (CMS/HCC) s/p PPM implantation with generator change in December of 2019  ECG 12 Lead    Adult Transthoracic Echo Complete W/ Cont if Necessary Per Protocol    CBC & Differential    Comprehensive Metabolic Panel    Magnesium    Lipid Panel    Vitamin B12    metoprolol succinate XL (TOPROL-XL) 50 MG 24 hr tablet   3. Dyspnea on exertion  ECG 12 Lead    Adult Transthoracic Echo Complete W/ Cont if Necessary Per Protocol    CBC & Differential    Comprehensive Metabolic Panel    Magnesium    Lipid Panel    Vitamin B12   4. PAD (peripheral artery disease) (CMS/HCC)  ECG 12 Lead    US Arterial Doppler Lower Extremity Complete    CBC & Differential    Comprehensive Metabolic Panel    Magnesium    Lipid Panel    Vitamin B12   5. Intermittent claudication (CMS/HCC)  ECG 12 Lead    US Arterial Doppler Lower Extremity Complete    CBC & Differential    Comprehensive Metabolic Panel    Magnesium    Lipid Panel    Vitamin B12                Recommendations:    1. I have ordered an echocardiogram to evaluate her LV function and tailor further therapy accordingly.  2. Arterial Doppler ordered to evaluate her claudication symptoms as well as recent abnormal reported PAVEL.  However, she does have excellent pedal pulses on exam today.  3. CBC, CMP, lipid panel, and magnesium ordered.  4. We have given her an appointment for pacemaker interrogation in office tomorrow at 11:30 AM.  She states she will come for this.  5. Follow-up in 4 to 6 weeks or sooner if needed.        Return in about 6  weeks (around 4/21/2021) for Recheck.    As always, I appreciate very much the opportunity to participate in the cardiovascular care of your patients.      With Best Regards,    JOSUÉ Khan

## 2021-03-11 ENCOUNTER — CLINICAL SUPPORT (OUTPATIENT)
Dept: CARDIOLOGY | Facility: CLINIC | Age: 76
End: 2021-03-11

## 2021-03-11 DIAGNOSIS — I49.5 SSS (SICK SINUS SYNDROME) (HCC): Primary | ICD-10-CM

## 2021-03-11 PROCEDURE — 93288 INTERROG EVL PM/LDLS PM IP: CPT | Performed by: INTERNAL MEDICINE

## 2021-03-25 ENCOUNTER — HOSPITAL ENCOUNTER (OUTPATIENT)
Dept: CARDIOLOGY | Facility: HOSPITAL | Age: 76
Discharge: HOME | End: 2021-03-25

## 2021-03-25 DIAGNOSIS — I49.5 TACHY-BRADY SYNDROME (HCC): ICD-10-CM

## 2021-03-25 DIAGNOSIS — I73.9 INTERMITTENT CLAUDICATION (HCC): ICD-10-CM

## 2021-03-25 DIAGNOSIS — R06.09 DYSPNEA ON EXERTION: ICD-10-CM

## 2021-03-25 DIAGNOSIS — I25.10 ASCVD (ARTERIOSCLEROTIC CARDIOVASCULAR DISEASE): ICD-10-CM

## 2021-03-25 DIAGNOSIS — I73.9 PAD (PERIPHERAL ARTERY DISEASE) (HCC): ICD-10-CM

## 2021-03-25 PROCEDURE — 93306 TTE W/DOPPLER COMPLETE: CPT

## 2021-03-25 PROCEDURE — 93925 LOWER EXTREMITY STUDY: CPT | Performed by: RADIOLOGY

## 2021-03-25 PROCEDURE — 93925 LOWER EXTREMITY STUDY: CPT

## 2021-03-25 PROCEDURE — 93306 TTE W/DOPPLER COMPLETE: CPT | Performed by: INTERNAL MEDICINE

## 2021-03-28 LAB
BH CV ECHO MEAS - % IVS THICK: 7.4 %
BH CV ECHO MEAS - % LVPW THICK: 84.3 %
BH CV ECHO MEAS - ACS: 2 CM
BH CV ECHO MEAS - AO ROOT AREA (BSA CORRECTED): 1.7
BH CV ECHO MEAS - AO ROOT AREA: 6.8 CM^2
BH CV ECHO MEAS - AO ROOT DIAM: 3 CM
BH CV ECHO MEAS - BSA(HAYCOCK): 1.8 M^2
BH CV ECHO MEAS - BSA: 1.7 M^2
BH CV ECHO MEAS - BZI_BMI: 27.6 KILOGRAMS/M^2
BH CV ECHO MEAS - BZI_METRIC_HEIGHT: 157.5 CM
BH CV ECHO MEAS - BZI_METRIC_WEIGHT: 68.5 KG
BH CV ECHO MEAS - EDV(CUBED): 72.5 ML
BH CV ECHO MEAS - EDV(MOD-SP4): 25.6 ML
BH CV ECHO MEAS - EDV(TEICH): 77.3 ML
BH CV ECHO MEAS - EF(CUBED): 89.7 %
BH CV ECHO MEAS - EF(MOD-SP4): 58.6 %
BH CV ECHO MEAS - EF(TEICH): 84.5 %
BH CV ECHO MEAS - ESV(CUBED): 7.5 ML
BH CV ECHO MEAS - ESV(MOD-SP4): 10.6 ML
BH CV ECHO MEAS - ESV(TEICH): 12 ML
BH CV ECHO MEAS - FS: 53.1 %
BH CV ECHO MEAS - IVS/LVPW: 1.3
BH CV ECHO MEAS - IVSD: 1.5 CM
BH CV ECHO MEAS - IVSS: 1.6 CM
BH CV ECHO MEAS - LA DIMENSION: 3.9 CM
BH CV ECHO MEAS - LA/AO: 1.3
BH CV ECHO MEAS - LV DIASTOLIC VOL/BSA (35-75): 15.1 ML/M^2
BH CV ECHO MEAS - LV MASS(C)D: 213.7 GRAMS
BH CV ECHO MEAS - LV MASS(C)DI: 126 GRAMS/M^2
BH CV ECHO MEAS - LV MASS(C)S: 158 GRAMS
BH CV ECHO MEAS - LV MASS(C)SI: 93.1 GRAMS/M^2
BH CV ECHO MEAS - LV SYSTOLIC VOL/BSA (12-30): 6.2 ML/M^2
BH CV ECHO MEAS - LVIDD: 4.2 CM
BH CV ECHO MEAS - LVIDS: 2 CM
BH CV ECHO MEAS - LVLD AP4: 5.8 CM
BH CV ECHO MEAS - LVLS AP4: 4.6 CM
BH CV ECHO MEAS - LVOT AREA (M): 2.3 CM^2
BH CV ECHO MEAS - LVOT AREA: 2.3 CM^2
BH CV ECHO MEAS - LVOT DIAM: 1.7 CM
BH CV ECHO MEAS - LVPWD: 1.2 CM
BH CV ECHO MEAS - LVPWS: 2.2 CM
BH CV ECHO MEAS - MV A MAX VEL: 158 CM/SEC
BH CV ECHO MEAS - MV E MAX VEL: 124 CM/SEC
BH CV ECHO MEAS - MV E/A: 0.78
BH CV ECHO MEAS - PA ACC TIME: 0.13 SEC
BH CV ECHO MEAS - PA PR(ACCEL): 18.7 MMHG
BH CV ECHO MEAS - RVDD: 2.1 CM
BH CV ECHO MEAS - SI(CUBED): 38.3 ML/M^2
BH CV ECHO MEAS - SI(MOD-SP4): 8.8 ML/M^2
BH CV ECHO MEAS - SI(TEICH): 38.5 ML/M^2
BH CV ECHO MEAS - SV(CUBED): 65 ML
BH CV ECHO MEAS - SV(MOD-SP4): 15 ML
BH CV ECHO MEAS - SV(TEICH): 65.2 ML
MAXIMAL PREDICTED HEART RATE: 146 BPM
STRESS TARGET HR: 124 BPM

## 2021-03-29 ENCOUNTER — TELEPHONE (OUTPATIENT)
Dept: CARDIOLOGY | Facility: CLINIC | Age: 76
End: 2021-03-29

## 2021-03-29 NOTE — TELEPHONE ENCOUNTER
Her heart pump is normal.  She does have some moderate valve leakage.  We do not need to make any changes right now but we will have to continue to monitor this.  We can discuss more in depth at her follow-up.

## 2021-04-07 ENCOUNTER — TELEPHONE (OUTPATIENT)
Dept: CARDIOLOGY | Facility: CLINIC | Age: 76
End: 2021-04-07

## 2021-04-07 NOTE — TELEPHONE ENCOUNTER
Called patient in regards to the surgery clearance for her cataract surgery on both eyes that will be on different  Fax number for Dr Perez is [phone]

## 2021-06-10 ENCOUNTER — TREATMENT (OUTPATIENT)
Dept: CARDIOLOGY | Facility: CLINIC | Age: 76
End: 2021-06-10

## 2021-06-10 DIAGNOSIS — I49.5 SSS (SICK SINUS SYNDROME) (HCC): Primary | ICD-10-CM

## 2021-06-10 PROCEDURE — 93294 REM INTERROG EVL PM/LDLS PM: CPT | Performed by: INTERNAL MEDICINE

## 2021-06-10 PROCEDURE — 93296 REM INTERROG EVL PM/IDS: CPT | Performed by: INTERNAL MEDICINE

## 2021-08-23 ENCOUNTER — HOSPITAL ENCOUNTER (OUTPATIENT)
Dept: HOSPITAL 79 - KOH-I | Age: 76
End: 2021-08-23
Attending: FAMILY MEDICINE
Payer: MEDICARE

## 2021-08-23 DIAGNOSIS — M25.512: ICD-10-CM

## 2021-08-23 DIAGNOSIS — R27.0: Primary | ICD-10-CM

## 2021-09-14 DIAGNOSIS — I49.5 TACHY-BRADY SYNDROME (HCC): ICD-10-CM

## 2021-09-14 RX ORDER — METOPROLOL SUCCINATE 50 MG/1
TABLET, EXTENDED RELEASE ORAL
Qty: 90 TABLET | Refills: 1 | Status: SHIPPED | OUTPATIENT
Start: 2021-09-14 | End: 2022-07-11

## 2021-12-08 ENCOUNTER — TREATMENT (OUTPATIENT)
Dept: CARDIOLOGY | Facility: CLINIC | Age: 76
End: 2021-12-08

## 2021-12-08 DIAGNOSIS — I49.5 SSS (SICK SINUS SYNDROME) (HCC): Primary | ICD-10-CM

## 2021-12-08 PROCEDURE — 93294 REM INTERROG EVL PM/LDLS PM: CPT | Performed by: INTERNAL MEDICINE

## 2021-12-08 PROCEDURE — 93296 REM INTERROG EVL PM/IDS: CPT | Performed by: INTERNAL MEDICINE

## 2022-03-10 ENCOUNTER — TREATMENT (OUTPATIENT)
Dept: CARDIOLOGY | Facility: CLINIC | Age: 77
End: 2022-03-10

## 2022-03-10 DIAGNOSIS — I49.5 SSS (SICK SINUS SYNDROME): Primary | ICD-10-CM

## 2022-03-10 PROCEDURE — 93294 REM INTERROG EVL PM/LDLS PM: CPT | Performed by: INTERNAL MEDICINE

## 2022-03-15 PROCEDURE — 93296 REM INTERROG EVL PM/IDS: CPT | Performed by: INTERNAL MEDICINE

## 2022-07-11 ENCOUNTER — TELEPHONE (OUTPATIENT)
Dept: CARDIOLOGY | Facility: CLINIC | Age: 77
End: 2022-07-11

## 2022-07-11 DIAGNOSIS — I49.5 TACHY-BRADY SYNDROME: ICD-10-CM

## 2022-07-11 RX ORDER — METOPROLOL SUCCINATE 50 MG/1
TABLET, EXTENDED RELEASE ORAL
Qty: 30 TABLET | Refills: 0 | Status: ON HOLD | OUTPATIENT
Start: 2022-07-11 | End: 2023-03-14

## 2022-07-12 ENCOUNTER — TELEPHONE (OUTPATIENT)
Dept: CARDIOLOGY | Facility: CLINIC | Age: 77
End: 2022-07-12

## 2022-08-18 DIAGNOSIS — I49.5 TACHY-BRADY SYNDROME: ICD-10-CM

## 2022-08-18 RX ORDER — METOPROLOL SUCCINATE 50 MG/1
TABLET, EXTENDED RELEASE ORAL
Refills: 0 | OUTPATIENT
Start: 2022-08-18

## 2022-09-09 ENCOUNTER — TELEPHONE (OUTPATIENT)
Dept: CARDIOLOGY | Age: 77
End: 2022-09-09

## 2022-10-13 ENCOUNTER — TELEPHONE (OUTPATIENT)
Dept: CARDIOLOGY | Facility: CLINIC | Age: 77
End: 2022-10-13

## 2022-10-17 ENCOUNTER — TELEPHONE (OUTPATIENT)
Dept: CARDIOLOGY | Facility: CLINIC | Age: 77
End: 2022-10-17

## 2022-10-17 NOTE — TELEPHONE ENCOUNTER
I called the patient to let them know that their scheduled remote device transmission did not come through and spoke with her daughter  They were not home but will try to send one once they arrive home, and will call for assistance if needed.  I left my return call number

## 2022-11-02 ENCOUNTER — TELEPHONE (OUTPATIENT)
Dept: CARDIOLOGY | Facility: CLINIC | Age: 77
End: 2022-11-02

## 2023-03-09 ENCOUNTER — TELEPHONE (OUTPATIENT)
Dept: CARDIOLOGY | Facility: CLINIC | Age: 78
End: 2023-03-09
Payer: MEDICARE

## 2023-03-14 ENCOUNTER — APPOINTMENT (OUTPATIENT)
Dept: GENERAL RADIOLOGY | Facility: HOSPITAL | Age: 78
End: 2023-03-14
Payer: MEDICARE

## 2023-03-14 ENCOUNTER — APPOINTMENT (OUTPATIENT)
Dept: CT IMAGING | Facility: HOSPITAL | Age: 78
End: 2023-03-14
Payer: MEDICARE

## 2023-03-14 ENCOUNTER — HOSPITAL ENCOUNTER (OUTPATIENT)
Facility: HOSPITAL | Age: 78
Setting detail: OBSERVATION
Discharge: HOME HEALTH | End: 2023-03-18
Attending: STUDENT IN AN ORGANIZED HEALTH CARE EDUCATION/TRAINING PROGRAM | Admitting: HOSPITALIST
Payer: MEDICARE

## 2023-03-14 DIAGNOSIS — I49.5 TACHY-BRADY SYNDROME: ICD-10-CM

## 2023-03-14 DIAGNOSIS — I49.5 SICK SINUS SYNDROME: ICD-10-CM

## 2023-03-14 DIAGNOSIS — R07.9 CHEST PAIN, UNSPECIFIED TYPE: Primary | ICD-10-CM

## 2023-03-14 DIAGNOSIS — R55 NEAR SYNCOPE: ICD-10-CM

## 2023-03-14 DIAGNOSIS — Z95.0 CARDIAC PACEMAKER: ICD-10-CM

## 2023-03-14 LAB
ALBUMIN SERPL-MCNC: 3.8 G/DL (ref 3.5–5.2)
ALBUMIN/GLOB SERPL: 1.2 G/DL
ALP SERPL-CCNC: 67 U/L (ref 39–117)
ALT SERPL W P-5'-P-CCNC: <5 U/L (ref 1–33)
ANION GAP SERPL CALCULATED.3IONS-SCNC: 11 MMOL/L (ref 5–15)
APTT PPP: 25.7 SECONDS (ref 26.5–34.5)
AST SERPL-CCNC: 15 U/L (ref 1–32)
BACTERIA UR QL AUTO: ABNORMAL /HPF
BASOPHILS # BLD AUTO: 0.05 10*3/MM3 (ref 0–0.2)
BASOPHILS NFR BLD AUTO: 0.5 % (ref 0–1.5)
BILIRUB SERPL-MCNC: <0.2 MG/DL (ref 0–1.2)
BILIRUB UR QL STRIP: NEGATIVE
BUN SERPL-MCNC: 24 MG/DL (ref 8–23)
BUN/CREAT SERPL: 32.9 (ref 7–25)
CALCIUM SPEC-SCNC: 9.6 MG/DL (ref 8.6–10.5)
CHLORIDE SERPL-SCNC: 103 MMOL/L (ref 98–107)
CLARITY UR: ABNORMAL
CO2 SERPL-SCNC: 26 MMOL/L (ref 22–29)
COLOR UR: ABNORMAL
CREAT SERPL-MCNC: 0.73 MG/DL (ref 0.57–1)
CRP SERPL-MCNC: <0.3 MG/DL (ref 0–0.5)
D-LACTATE SERPL-SCNC: 0.9 MMOL/L (ref 0.5–2)
DEPRECATED RDW RBC AUTO: 44 FL (ref 37–54)
EGFRCR SERPLBLD CKD-EPI 2021: 85.4 ML/MIN/1.73
EOSINOPHIL # BLD AUTO: 0.16 10*3/MM3 (ref 0–0.4)
EOSINOPHIL NFR BLD AUTO: 1.7 % (ref 0.3–6.2)
ERYTHROCYTE [DISTWIDTH] IN BLOOD BY AUTOMATED COUNT: 15.6 % (ref 12.3–15.4)
FLUAV RNA RESP QL NAA+PROBE: NOT DETECTED
FLUBV RNA RESP QL NAA+PROBE: NOT DETECTED
GEN 5 2HR TROPONIN T REFLEX: 17 NG/L
GLOBULIN UR ELPH-MCNC: 3.2 GM/DL
GLUCOSE SERPL-MCNC: 109 MG/DL (ref 65–99)
GLUCOSE UR STRIP-MCNC: NEGATIVE MG/DL
HCT VFR BLD AUTO: 37.2 % (ref 34–46.6)
HGB BLD-MCNC: 11.4 G/DL (ref 12–15.9)
HGB UR QL STRIP.AUTO: NEGATIVE
HOLD SPECIMEN: NORMAL
HOLD SPECIMEN: NORMAL
HYALINE CASTS UR QL AUTO: ABNORMAL /LPF
IMM GRANULOCYTES # BLD AUTO: 0.03 10*3/MM3 (ref 0–0.05)
IMM GRANULOCYTES NFR BLD AUTO: 0.3 % (ref 0–0.5)
INR PPP: 1.01 (ref 0.9–1.1)
KETONES UR QL STRIP: NEGATIVE
LEUKOCYTE ESTERASE UR QL STRIP.AUTO: ABNORMAL
LYMPHOCYTES # BLD AUTO: 2.65 10*3/MM3 (ref 0.7–3.1)
LYMPHOCYTES NFR BLD AUTO: 28.3 % (ref 19.6–45.3)
MCH RBC QN AUTO: 24.3 PG (ref 26.6–33)
MCHC RBC AUTO-ENTMCNC: 30.6 G/DL (ref 31.5–35.7)
MCV RBC AUTO: 79.3 FL (ref 79–97)
MONOCYTES # BLD AUTO: 0.95 10*3/MM3 (ref 0.1–0.9)
MONOCYTES NFR BLD AUTO: 10.2 % (ref 5–12)
NEUTROPHILS NFR BLD AUTO: 5.51 10*3/MM3 (ref 1.7–7)
NEUTROPHILS NFR BLD AUTO: 59 % (ref 42.7–76)
NITRITE UR QL STRIP: POSITIVE
NRBC BLD AUTO-RTO: 0 /100 WBC (ref 0–0.2)
PH UR STRIP.AUTO: >=9 [PH] (ref 5–8)
PLATELET # BLD AUTO: 283 10*3/MM3 (ref 140–450)
PMV BLD AUTO: 9.6 FL (ref 6–12)
POTASSIUM SERPL-SCNC: 4 MMOL/L (ref 3.5–5.2)
PROCALCITONIN SERPL-MCNC: <0.02 NG/ML (ref 0–0.25)
PROT SERPL-MCNC: 7 G/DL (ref 6–8.5)
PROT UR QL STRIP: ABNORMAL
PROTHROMBIN TIME: 13.5 SECONDS (ref 12.1–14.7)
RBC # BLD AUTO: 4.69 10*6/MM3 (ref 3.77–5.28)
RBC # UR STRIP: ABNORMAL /HPF
REF LAB TEST METHOD: ABNORMAL
SARS-COV-2 RNA RESP QL NAA+PROBE: NOT DETECTED
SODIUM SERPL-SCNC: 140 MMOL/L (ref 136–145)
SP GR UR STRIP: 1.02 (ref 1–1.03)
SQUAMOUS #/AREA URNS HPF: ABNORMAL /HPF
TROPONIN T DELTA: -1 NG/L
TROPONIN T SERPL HS-MCNC: 18 NG/L
TSH SERPL DL<=0.05 MIU/L-ACNC: 1 UIU/ML (ref 0.27–4.2)
UROBILINOGEN UR QL STRIP: ABNORMAL
WBC # UR STRIP: ABNORMAL /HPF
WBC NRBC COR # BLD: 9.35 10*3/MM3 (ref 3.4–10.8)
WHOLE BLOOD HOLD COAG: NORMAL
WHOLE BLOOD HOLD SPECIMEN: NORMAL

## 2023-03-14 PROCEDURE — 81001 URINALYSIS AUTO W/SCOPE: CPT | Performed by: NURSE PRACTITIONER

## 2023-03-14 PROCEDURE — 87186 SC STD MICRODIL/AGAR DIL: CPT | Performed by: NURSE PRACTITIONER

## 2023-03-14 PROCEDURE — 70450 CT HEAD/BRAIN W/O DYE: CPT

## 2023-03-14 PROCEDURE — 99223 1ST HOSP IP/OBS HIGH 75: CPT | Performed by: STUDENT IN AN ORGANIZED HEALTH CARE EDUCATION/TRAINING PROGRAM

## 2023-03-14 PROCEDURE — 87088 URINE BACTERIA CULTURE: CPT | Performed by: NURSE PRACTITIONER

## 2023-03-14 PROCEDURE — 71045 X-RAY EXAM CHEST 1 VIEW: CPT

## 2023-03-14 PROCEDURE — 85610 PROTHROMBIN TIME: CPT | Performed by: NURSE PRACTITIONER

## 2023-03-14 PROCEDURE — 25010000002 ENOXAPARIN PER 10 MG: Performed by: STUDENT IN AN ORGANIZED HEALTH CARE EDUCATION/TRAINING PROGRAM

## 2023-03-14 PROCEDURE — 70450 CT HEAD/BRAIN W/O DYE: CPT | Performed by: RADIOLOGY

## 2023-03-14 PROCEDURE — 87076 CULTURE ANAEROBE IDENT EACH: CPT | Performed by: NURSE PRACTITIONER

## 2023-03-14 PROCEDURE — 96372 THER/PROPH/DIAG INJ SC/IM: CPT

## 2023-03-14 PROCEDURE — 96365 THER/PROPH/DIAG IV INF INIT: CPT

## 2023-03-14 PROCEDURE — 25010000002 CEFTRIAXONE PER 250 MG: Performed by: NURSE PRACTITIONER

## 2023-03-14 PROCEDURE — 93005 ELECTROCARDIOGRAM TRACING: CPT | Performed by: STUDENT IN AN ORGANIZED HEALTH CARE EDUCATION/TRAINING PROGRAM

## 2023-03-14 PROCEDURE — 87636 SARSCOV2 & INF A&B AMP PRB: CPT | Performed by: NURSE PRACTITIONER

## 2023-03-14 PROCEDURE — 85730 THROMBOPLASTIN TIME PARTIAL: CPT | Performed by: NURSE PRACTITIONER

## 2023-03-14 PROCEDURE — 84145 PROCALCITONIN (PCT): CPT | Performed by: NURSE PRACTITIONER

## 2023-03-14 PROCEDURE — C9803 HOPD COVID-19 SPEC COLLECT: HCPCS

## 2023-03-14 PROCEDURE — G0378 HOSPITAL OBSERVATION PER HR: HCPCS

## 2023-03-14 PROCEDURE — 87086 URINE CULTURE/COLONY COUNT: CPT | Performed by: NURSE PRACTITIONER

## 2023-03-14 PROCEDURE — 87147 CULTURE TYPE IMMUNOLOGIC: CPT | Performed by: NURSE PRACTITIONER

## 2023-03-14 PROCEDURE — 83605 ASSAY OF LACTIC ACID: CPT | Performed by: NURSE PRACTITIONER

## 2023-03-14 PROCEDURE — 87040 BLOOD CULTURE FOR BACTERIA: CPT | Performed by: NURSE PRACTITIONER

## 2023-03-14 PROCEDURE — 84443 ASSAY THYROID STIM HORMONE: CPT | Performed by: STUDENT IN AN ORGANIZED HEALTH CARE EDUCATION/TRAINING PROGRAM

## 2023-03-14 PROCEDURE — 80053 COMPREHEN METABOLIC PANEL: CPT | Performed by: NURSE PRACTITIONER

## 2023-03-14 PROCEDURE — 84484 ASSAY OF TROPONIN QUANT: CPT | Performed by: NURSE PRACTITIONER

## 2023-03-14 PROCEDURE — 71045 X-RAY EXAM CHEST 1 VIEW: CPT | Performed by: RADIOLOGY

## 2023-03-14 PROCEDURE — 93005 ELECTROCARDIOGRAM TRACING: CPT | Performed by: NURSE PRACTITIONER

## 2023-03-14 PROCEDURE — 85025 COMPLETE CBC W/AUTO DIFF WBC: CPT | Performed by: NURSE PRACTITIONER

## 2023-03-14 PROCEDURE — 86140 C-REACTIVE PROTEIN: CPT | Performed by: NURSE PRACTITIONER

## 2023-03-14 PROCEDURE — 87185 SC STD ENZYME DETCJ PER NZM: CPT | Performed by: NURSE PRACTITIONER

## 2023-03-14 PROCEDURE — 36415 COLL VENOUS BLD VENIPUNCTURE: CPT

## 2023-03-14 PROCEDURE — 87150 DNA/RNA AMPLIFIED PROBE: CPT | Performed by: NURSE PRACTITIONER

## 2023-03-14 PROCEDURE — 99285 EMERGENCY DEPT VISIT HI MDM: CPT

## 2023-03-14 RX ORDER — CARBIDOPA/LEVODOPA 25MG-250MG
1 TABLET ORAL 4 TIMES DAILY
COMMUNITY

## 2023-03-14 RX ORDER — AMLODIPINE BESYLATE 5 MG/1
5 TABLET ORAL DAILY
Status: DISCONTINUED | OUTPATIENT
Start: 2023-03-15 | End: 2023-03-17

## 2023-03-14 RX ORDER — POLYETHYLENE GLYCOL 3350 17 G/17G
17 POWDER, FOR SOLUTION ORAL DAILY PRN
Status: DISCONTINUED | OUTPATIENT
Start: 2023-03-14 | End: 2023-03-18 | Stop reason: HOSPADM

## 2023-03-14 RX ORDER — PRAMIPEXOLE DIHYDROCHLORIDE 0.12 MG/1
0.5 TABLET ORAL NIGHTLY
Status: DISCONTINUED | OUTPATIENT
Start: 2023-03-15 | End: 2023-03-18 | Stop reason: HOSPADM

## 2023-03-14 RX ORDER — METOPROLOL SUCCINATE 50 MG/1
50 TABLET, EXTENDED RELEASE ORAL DAILY
COMMUNITY
End: 2023-03-18 | Stop reason: HOSPADM

## 2023-03-14 RX ORDER — NITROGLYCERIN 0.4 MG/1
0.4 TABLET SUBLINGUAL
Status: DISCONTINUED | OUTPATIENT
Start: 2023-03-14 | End: 2023-03-18 | Stop reason: HOSPADM

## 2023-03-14 RX ORDER — CLONAZEPAM 0.5 MG/1
0.5 TABLET ORAL 2 TIMES DAILY PRN
Status: ON HOLD | COMMUNITY
End: 2023-03-14

## 2023-03-14 RX ORDER — ATORVASTATIN CALCIUM 20 MG/1
20 TABLET, FILM COATED ORAL DAILY
Status: DISCONTINUED | OUTPATIENT
Start: 2023-03-15 | End: 2023-03-18 | Stop reason: HOSPADM

## 2023-03-14 RX ORDER — CHOLECALCIFEROL (VITAMIN D3) 125 MCG
5 CAPSULE ORAL NIGHTLY PRN
Status: DISCONTINUED | OUTPATIENT
Start: 2023-03-14 | End: 2023-03-18 | Stop reason: HOSPADM

## 2023-03-14 RX ORDER — AMOXICILLIN 250 MG
2 CAPSULE ORAL NIGHTLY
Status: DISCONTINUED | OUTPATIENT
Start: 2023-03-14 | End: 2023-03-18 | Stop reason: HOSPADM

## 2023-03-14 RX ORDER — CETIRIZINE HYDROCHLORIDE 10 MG/1
10 TABLET ORAL DAILY
Status: CANCELLED | OUTPATIENT
Start: 2023-03-15

## 2023-03-14 RX ORDER — ACETAMINOPHEN 500 MG
1000 TABLET ORAL ONCE
Status: COMPLETED | OUTPATIENT
Start: 2023-03-14 | End: 2023-03-14

## 2023-03-14 RX ORDER — PANTOPRAZOLE SODIUM 40 MG/1
40 TABLET, DELAYED RELEASE ORAL
Status: DISCONTINUED | OUTPATIENT
Start: 2023-03-15 | End: 2023-03-18 | Stop reason: HOSPADM

## 2023-03-14 RX ORDER — SODIUM CHLORIDE 9 MG/ML
40 INJECTION, SOLUTION INTRAVENOUS AS NEEDED
Status: DISCONTINUED | OUTPATIENT
Start: 2023-03-14 | End: 2023-03-18 | Stop reason: HOSPADM

## 2023-03-14 RX ORDER — ASPIRIN 81 MG/1
81 TABLET, CHEWABLE ORAL DAILY
Status: DISCONTINUED | OUTPATIENT
Start: 2023-03-15 | End: 2023-03-18 | Stop reason: HOSPADM

## 2023-03-14 RX ORDER — ACETAMINOPHEN 500 MG
TABLET ORAL
Status: COMPLETED
Start: 2023-03-14 | End: 2023-03-17

## 2023-03-14 RX ORDER — GABAPENTIN 100 MG/1
100 CAPSULE ORAL 2 TIMES DAILY PRN
COMMUNITY
End: 2023-03-18 | Stop reason: HOSPADM

## 2023-03-14 RX ORDER — CARBIDOPA/LEVODOPA 25MG-250MG
1 TABLET ORAL 4 TIMES DAILY
Status: DISCONTINUED | OUTPATIENT
Start: 2023-03-15 | End: 2023-03-18 | Stop reason: HOSPADM

## 2023-03-14 RX ORDER — AMLODIPINE BESYLATE 5 MG/1
5 TABLET ORAL DAILY
COMMUNITY
End: 2023-03-18 | Stop reason: HOSPADM

## 2023-03-14 RX ORDER — GABAPENTIN 100 MG/1
100 CAPSULE ORAL 2 TIMES DAILY PRN
Status: CANCELLED | OUTPATIENT
Start: 2023-03-14

## 2023-03-14 RX ORDER — ENOXAPARIN SODIUM 100 MG/ML
40 INJECTION SUBCUTANEOUS DAILY
Status: DISCONTINUED | OUTPATIENT
Start: 2023-03-14 | End: 2023-03-17

## 2023-03-14 RX ORDER — NITROGLYCERIN 0.4 MG/1
0.4 TABLET SUBLINGUAL
Status: CANCELLED | OUTPATIENT
Start: 2023-03-14

## 2023-03-14 RX ORDER — ONDANSETRON 4 MG/1
4 TABLET, ORALLY DISINTEGRATING ORAL EVERY 12 HOURS PRN
Status: DISCONTINUED | OUTPATIENT
Start: 2023-03-14 | End: 2023-03-18 | Stop reason: HOSPADM

## 2023-03-14 RX ORDER — METOPROLOL SUCCINATE 50 MG/1
50 TABLET, EXTENDED RELEASE ORAL DAILY
Status: CANCELLED | OUTPATIENT
Start: 2023-03-15

## 2023-03-14 RX ORDER — OMEPRAZOLE 20 MG/1
20 CAPSULE, DELAYED RELEASE ORAL 2 TIMES DAILY
COMMUNITY
End: 2023-03-18 | Stop reason: HOSPADM

## 2023-03-14 RX ORDER — SODIUM CHLORIDE 0.9 % (FLUSH) 0.9 %
10 SYRINGE (ML) INJECTION EVERY 12 HOURS SCHEDULED
Status: DISCONTINUED | OUTPATIENT
Start: 2023-03-14 | End: 2023-03-18 | Stop reason: HOSPADM

## 2023-03-14 RX ORDER — ACETAMINOPHEN 500 MG
500 TABLET ORAL EVERY 6 HOURS PRN
Status: DISCONTINUED | OUTPATIENT
Start: 2023-03-14 | End: 2023-03-18 | Stop reason: HOSPADM

## 2023-03-14 RX ORDER — AMITRIPTYLINE HYDROCHLORIDE 25 MG/1
25 TABLET, FILM COATED ORAL NIGHTLY
Status: DISCONTINUED | OUTPATIENT
Start: 2023-03-15 | End: 2023-03-18 | Stop reason: HOSPADM

## 2023-03-14 RX ORDER — CHOLECALCIFEROL (VITAMIN D3) 125 MCG
CAPSULE ORAL
Status: DISPENSED
Start: 2023-03-14 | End: 2023-03-15

## 2023-03-14 RX ORDER — AMITRIPTYLINE HYDROCHLORIDE 25 MG/1
25 TABLET, FILM COATED ORAL NIGHTLY
COMMUNITY

## 2023-03-14 RX ORDER — SODIUM CHLORIDE 0.9 % (FLUSH) 0.9 %
10 SYRINGE (ML) INJECTION AS NEEDED
Status: DISCONTINUED | OUTPATIENT
Start: 2023-03-14 | End: 2023-03-18 | Stop reason: HOSPADM

## 2023-03-14 RX ADMIN — ACETAMINOPHEN 1000 MG: 500 TABLET ORAL at 15:55

## 2023-03-14 RX ADMIN — METOPROLOL TARTRATE 25 MG: 25 TABLET, FILM COATED ORAL at 22:43

## 2023-03-14 RX ADMIN — ACETAMINOPHEN 500 MG: 500 TABLET ORAL at 22:43

## 2023-03-14 RX ADMIN — CEFTRIAXONE 1 G: 1 INJECTION, POWDER, FOR SOLUTION INTRAMUSCULAR; INTRAVENOUS at 18:18

## 2023-03-14 RX ADMIN — Medication 5 MG: at 22:43

## 2023-03-14 RX ADMIN — ENOXAPARIN SODIUM 40 MG: 40 INJECTION SUBCUTANEOUS at 22:44

## 2023-03-14 RX ADMIN — Medication 10 ML: at 22:44

## 2023-03-14 NOTE — ED PROVIDER NOTES
Subjective   History of Present Illness  Patient is a 77-year-old female presents emergency room today with complaint of right-sided headache and chest pain.  Patient reports that her headache started last night and states she is also noticed that she has had some drawing up her right hand at times.  Patient also reports that she has had some chest discomfort today.  She reports this Pain is mild and substernal but states that the pain has resolved at this time.  Patient denies any alleviating or worsening factors.  Patient denies trying interventions for this.  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        Review of Systems   Constitutional: Negative.    Eyes: Negative.    Respiratory: Negative.    Cardiovascular: Positive for chest pain.   Gastrointestinal: Negative.    Endocrine: Negative.    Genitourinary: Negative.    Musculoskeletal: Negative.    Skin: Negative.    Allergic/Immunologic: Negative.    Neurological: Positive for headaches.   Hematological: Negative.    Psychiatric/Behavioral: Negative.        Past Medical History:   Diagnosis Date   • Anemia    • Arthritis    • ASCVD (arteriosclerotic cardiovascular disease)    • Monae's esophagus    • COPD (chronic obstructive pulmonary disease) (CMS/Formerly [facility])    • Coronary artery disease    • Dyslipidemia    • Essential hypertension    • GERD (gastroesophageal reflux disease)    • Myocardial infarction (CMS/Formerly [facility])    • Osteoporosis        Allergies   Allergen Reactions   • Codeine Other (See Comments)     headaches   • Reglan [Metoclopramide] Unknown (See Comments)     Eyes twitch/involuntary muscle movement       Past Surgical History:   Procedure Laterality Date   • BACK SURGERY     • CARDIAC ELECTROPHYSIOLOGY PROCEDURE N/A 12/3/2019    Procedure: PPM battery change;  Surgeon: Flako Pavon MD;  Location: [facility] CATH INVASIVE LOCATION;  Service: Cardiology   • CORONARY STENT PLACEMENT      x 3   •  HYSTERECTOMY     • INSERT / REPLACE / REMOVE PACEMAKER     • PACEMAKER IMPLANTATION         Family History   Problem Relation Age of Onset   • Stroke Mother    • Heart disease Mother    • Stroke Father    • Hypertension Brother    • Hypertension Sister    • Stroke Sister    • Hypertension Sister    • Hypertension Sister    • Hypertension Sister    • Hypertension Brother    • Hypertension Brother    • Hypertension Brother        Social History     Socioeconomic History   • Marital status:    • Number of children: 5   Tobacco Use   • Smoking status: Never   • Smokeless tobacco: Never   Substance and Sexual Activity   • Alcohol use: No   • Drug use: No   • Sexual activity: Defer           Objective   Physical Exam  Vitals and nursing note reviewed.   Constitutional:       Appearance: She is well-developed.   HENT:      Head: Normocephalic.      Right Ear: External ear normal.      Left Ear: External ear normal.   Eyes:      Conjunctiva/sclera: Conjunctivae normal.      Pupils: Pupils are equal, round, and reactive to light.   Cardiovascular:      Rate and Rhythm: Normal rate and regular rhythm.      Heart sounds: Normal heart sounds.   Pulmonary:      Effort: Pulmonary effort is normal.      Breath sounds: Normal breath sounds.   Abdominal:      General: Bowel sounds are normal.      Palpations: Abdomen is soft.   Musculoskeletal:         General: Normal range of motion.      Cervical back: Normal range of motion and neck supple.   Skin:     General: Skin is warm and dry.      Capillary Refill: Capillary refill takes less than 2 seconds.   Neurological:      Mental Status: She is alert and oriented to person, place, and time.   Psychiatric:         Behavior: Behavior normal.         Thought Content: Thought content normal.         Procedures         Results for orders placed or performed during the hospital encounter of 03/14/23   COVID-19 and FLU A/B PCR - Swab, Nasopharynx    Specimen: Nasopharynx; Swab    Result Value Ref Range    COVID19 Not Detected Not Detected - Ref. Range    Influenza A PCR Not Detected Not Detected    Influenza B PCR Not Detected Not Detected   Comprehensive Metabolic Panel    Specimen: Blood   Result Value Ref Range    Glucose 109 (H) 65 - 99 mg/dL    BUN 24 (H) 8 - 23 mg/dL    Creatinine 0.73 0.57 - 1.00 mg/dL    Sodium 140 136 - 145 mmol/L    Potassium 4.0 3.5 - 5.2 mmol/L    Chloride 103 98 - 107 mmol/L    CO2 26.0 22.0 - 29.0 mmol/L    Calcium 9.6 8.6 - 10.5 mg/dL    Total Protein 7.0 6.0 - 8.5 g/dL    Albumin 3.8 3.5 - 5.2 g/dL    ALT (SGPT) <5 1 - 33 U/L    AST (SGOT) 15 1 - 32 U/L    Alkaline Phosphatase 67 39 - 117 U/L    Total Bilirubin <0.2 0.0 - 1.2 mg/dL    Globulin 3.2 gm/dL    A/G Ratio 1.2 g/dL    BUN/Creatinine Ratio 32.9 (H) 7.0 - 25.0    Anion Gap 11.0 5.0 - 15.0 mmol/L    eGFR 85.4 >60.0 mL/min/1.73   Urinalysis With Culture If Indicated - Urine, Clean Catch    Specimen: Urine, Clean Catch   Result Value Ref Range    Color, UA Dark Yellow (A) Yellow, Straw    Appearance, UA Cloudy (A) Clear    pH, UA >=9.0 (H) 5.0 - 8.0    Specific Gravity, UA 1.019 1.005 - 1.030    Glucose, UA Negative Negative    Ketones, UA Negative Negative    Bilirubin, UA Negative Negative    Blood, UA Negative Negative    Protein, UA Trace (A) Negative    Leuk Esterase, UA Moderate (2+) (A) Negative    Nitrite, UA Positive (A) Negative    Urobilinogen, UA 1.0 E.U./dL 0.2 - 1.0 E.U./dL   High Sensitivity Troponin T    Specimen: Blood   Result Value Ref Range    HS Troponin T 18 (H) <10 ng/L   Lactic Acid, Plasma    Specimen: Arm, Right; Blood   Result Value Ref Range    Lactate 0.9 0.5 - 2.0 mmol/L   C-reactive Protein    Specimen: Blood   Result Value Ref Range    C-Reactive Protein <0.30 0.00 - 0.50 mg/dL   Procalcitonin    Specimen: Blood   Result Value Ref Range    Procalcitonin <0.02 0.00 - 0.25 ng/mL   aPTT    Specimen: Blood   Result Value Ref Range    PTT 25.7 (L) 26.5 - 34.5 seconds    Protime-INR    Specimen: Blood   Result Value Ref Range    Protime 13.5 12.1 - 14.7 Seconds    INR 1.01 0.90 - 1.10   CBC Auto Differential    Specimen: Blood   Result Value Ref Range    WBC 9.35 3.40 - 10.80 10*3/mm3    RBC 4.69 3.77 - 5.28 10*6/mm3    Hemoglobin 11.4 (L) 12.0 - 15.9 g/dL    Hematocrit 37.2 34.0 - 46.6 %    MCV 79.3 79.0 - 97.0 fL    MCH 24.3 (L) 26.6 - 33.0 pg    MCHC 30.6 (L) 31.5 - 35.7 g/dL    RDW 15.6 (H) 12.3 - 15.4 %    RDW-SD 44.0 37.0 - 54.0 fl    MPV 9.6 6.0 - 12.0 fL    Platelets 283 140 - 450 10*3/mm3    Neutrophil % 59.0 42.7 - 76.0 %    Lymphocyte % 28.3 19.6 - 45.3 %    Monocyte % 10.2 5.0 - 12.0 %    Eosinophil % 1.7 0.3 - 6.2 %    Basophil % 0.5 0.0 - 1.5 %    Immature Grans % 0.3 0.0 - 0.5 %    Neutrophils, Absolute 5.51 1.70 - 7.00 10*3/mm3    Lymphocytes, Absolute 2.65 0.70 - 3.10 10*3/mm3    Monocytes, Absolute 0.95 (H) 0.10 - 0.90 10*3/mm3    Eosinophils, Absolute 0.16 0.00 - 0.40 10*3/mm3    Basophils, Absolute 0.05 0.00 - 0.20 10*3/mm3    Immature Grans, Absolute 0.03 0.00 - 0.05 10*3/mm3    nRBC 0.0 0.0 - 0.2 /100 WBC   High Sensitivity Troponin T 2Hr    Specimen: Blood   Result Value Ref Range    HS Troponin T 17 (H) <10 ng/L    Troponin T Delta -1 >=-4 - <+4 ng/L   Urinalysis, Microscopic Only - Urine, Clean Catch    Specimen: Urine, Clean Catch   Result Value Ref Range    RBC, UA 6-12 (A) None Seen, 0-2 /HPF    WBC, UA Too Numerous to Count (A) None Seen, 0-2 /HPF    Bacteria, UA 4+ (A) None Seen /HPF    Squamous Epithelial Cells, UA 0-2 None Seen, 0-2 /HPF    Hyaline Casts, UA 7-12 None Seen /LPF    Methodology Automated Microscopy    TSH    Specimen: Blood   Result Value Ref Range    TSH 0.999 0.270 - 4.200 uIU/mL   ECG 12 Lead Chest Pain   Result Value Ref Range    QT Interval 398 ms    QTC Interval 444 ms   Green Top (Gel)   Result Value Ref Range    Extra Tube Hold for add-ons.    Lavender Top   Result Value Ref Range    Extra Tube hold for add-on    Gold Top -  SST   Result Value Ref Range    Extra Tube Hold for add-ons.    Light Blue Top   Result Value Ref Range    Extra Tube Hold for add-ons.      XR Chest AP   Final Result   1.  No acute cardiopulmonary findings.   2.  Large hiatal hernia.   3.  Mild cardiac enlargement.       This report was finalized on 3/14/2023 3:49 PM by Dr. Magdaleno Matute MD.          CT Head Without Contrast   Final Result     Unremarkable exam demonstrating no CT evidence of acute intracranial   findings.       This report was finalized on 3/14/2023 3:50 PM by Dr. Magdaleno Matute MD.              ED Course  ED Course as of 03/14/23 2138   Tue Mar 14, 2023   1522 EKG at 1510 hrs. p.m. sinus rhythm 75 bpm, , QRS 92, QTc 444, multiple PVCs.  Left axis deviation.  No significant ST deviation or T wave abnormalities concerning for acute ischemia.  No STEMI.  Electronically signed by Magdaleno Pacheco MD, 03/14/23, 3:23 PM EDT.   [KP]   1554 Repeat EKG notes sinus rhythm.  PVCs noted.  78 bpm.  No acute ST elevation.    Electronically signed by Wellington Triana DO, 03/14/23, 3:54 PM EDT.   [SF]      ED Course User Index  [KP] Magdaleno Pacheco MD  [SF] Wellington Triana DO                                           Medical Decision Making  Patient is a 77-year-old female presents emergency room today with complaint of right-sided headache and chest pain.  Patient reports that her headache started last night and states she is also noticed that she has had some drawing up her right hand at times.  Patient also reports that she has had some chest discomfort today.  She reports this Pain is mild and substernal but states that the pain has resolved at this time.  Patient denies any alleviating or worsening factors.  Patient denies trying interventions for this.  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    Patient had episodes of arrhythmia in the ER and was symptomatic. Due to chest pain, history  and periods of arrhythmia patient was admitted into care of hospitalist.       Chest pain, unspecified type: chronic illness or injury  Amount and/or Complexity of Data Reviewed  Labs: ordered. Decision-making details documented in ED Course.  Radiology: ordered. Decision-making details documented in ED Course.  ECG/medicine tests: ordered.      Risk  OTC drugs.  Decision regarding hospitalization.          Final diagnoses:   Chest pain, unspecified type       ED Disposition  ED Disposition     ED Disposition   Decision to Admit    Condition   --    Comment   Level of Care: Telemetry [5]   Diagnosis: Chest pain, unspecified type [[id number]]   Admitting Physician: ANNMARIE NOLASCO [[id number]]   Attending Physician: ANNMARIE NOLASCO [[id number]]               No follow-up provider specified.       Medication List      ASK your doctor about these medications    amitriptyline 25 MG tablet  Commonly known as: ELAVIL  Ask about: Which instructions should I use?     amLODIPine 5 MG tablet  Commonly known as: NORVASC  Ask about: Which instructions should I use?             Geoffrey Sarabia P, APRN  03/14/23 [id number]

## 2023-03-14 NOTE — ED NOTES
Pt transported back from CT and once hooked back to monitor it was noted her HR was 26. JOSUÉ Cao called to bedside with Dr. Triana. Pts palpated pulse noted to be approximately 76 bpm.

## 2023-03-14 NOTE — H&P
"    [facility]    Patient Identification:  Name:  Layne Rodriguez  Age:  77 y.o.  Sex:  female  :  1945  MRN:  [id number]   Visit Number:  [id number]  Admit Date: 3/14/2023   Room number:  406/06  Primary Care Physician:  Kreis, Samuel Duane, MD    Date of Admission: 3/14/2023     Subjective     Chief complaint:    Chief Complaint   Patient presents with   • Headache     History of presenting illness:  77 y.o. female who was admitted on 3/14/2023 w/palpitations and HA.    Patient has known PMH of SSS s/p [facility] [facility] PM w/generator change  presenting today with multi-week hx of increasingly frequent palpitations associated with HA, presyncopal episodes, and pain radiating into her neck. Sx worsen with activity and have also began to occur at rest without clear alleviating factor. Duration is short lived and denies associated dyspnea or diaphoresis, quickly returns to baseline a few seconds to minutes after episode starts. Patient states it has been around 4 years since she saw her cardiologist Dr. Pavon but has been compliant with all medications with exception of her metoprolol which she splits in half because the 50mg succinate dose caused her to have intolerable fatigue.     In ED she ambulated to bathroom then called out to nursing staff that she felt like she was \"going to go out\". At that time tele monitor noted bigeminy with bradycardia in 40s reported however PA arrived at bedside and reported palpable HR was around 76 bpm with sx resolved by the time provider arrived at bedside.    Given above, it was decided to admit patient to tele unit for overnight observation.    As side note, patient did have UA with bacteria in ED and was given one dose CTX and does endorse suprapubic pain that worsens with urination along with infrequent incontinence but denies fever or chills. No back pain or cva tenderness.    Prior to admission I discussed patient's presentation " and management with attending ER physician and verbal handoff received.  ---------------------------------------------------------------------------------------------------------------------   A thorough systems based relevant ROS was asked and was negative except as noted above.  ---------------------------------------------------------------------------------------------------------------------   Past Medical History:   Diagnosis Date   • Anemia    • Arthritis    • ASCVD (arteriosclerotic cardiovascular disease)    • Monae's esophagus    • COPD (chronic obstructive pulmonary disease) (CMS/[facility])    • Coronary artery disease    • Dyslipidemia    • Essential hypertension    • GERD (gastroesophageal reflux disease)    • Myocardial infarction (CMS/[facility])    • Osteoporosis      Past Surgical History:   Procedure Laterality Date   • BACK SURGERY     • CARDIAC ELECTROPHYSIOLOGY PROCEDURE N/A 12/3/2019    Procedure: PPM battery change;  Surgeon: Flako Pavon MD;  Location: [facility] INVASIVE LOCATION;  Service: Cardiology   • CORONARY STENT PLACEMENT      x 3   • HYSTERECTOMY     • INSERT / REPLACE / REMOVE PACEMAKER     • PACEMAKER IMPLANTATION       Family History   Problem Relation Age of Onset   • Stroke Mother    • Heart disease Mother    • Stroke Father    • Hypertension Brother    • Hypertension Sister    • Stroke Sister    • Hypertension Sister    • Hypertension Sister    • Hypertension Sister    • Hypertension Brother    • Hypertension Brother    • Hypertension Brother      Social History     Socioeconomic History   • Marital status:    • Number of children: 5   Tobacco Use   • Smoking status: Never   • Smokeless tobacco: Never   Substance and Sexual Activity   • Alcohol use: No   • Drug use: No   • Sexual activity: Defer     ---------------------------------------------------------------------------------------------------------------------   Allergies:  Codeine and Reglan  [metoclopramide]  ---------------------------------------------------------------------------------------------------------------------   Medications below are reported home medications pulling from within the system; at this time, these medications have not been reconciled unless otherwise specified and are in the verification process for further verifcation as current home medications.      Prior to Admission Medications     Prescriptions Last Dose Informant Patient Reported? Taking?    amitriptyline (ELAVIL) 100 MG tablet   No No    Take 0.5 tablets by mouth Every Night.    amLODIPine (NORVASC) 10 MG tablet   No No    Take 1 tablet by mouth Daily.    aspirin 81 MG chewable tablet   Yes No    Chew 81 mg Daily.    atorvastatin (LIPITOR) 20 MG tablet   Yes No    Take 20 mg by mouth Daily.    dexlansoprazole (DEXILANT) 60 MG capsule   Yes No    Take 60 mg by mouth Daily.    loratadine (CLARITIN) 10 MG tablet   Yes No    Take 10 mg by mouth Daily.    metoprolol succinate XL (TOPROL-XL) 50 MG 24 hr tablet   No No    TAKE 1 TABLET BY MOUTH ONCE DAILY    niacin 500 MG tablet   Yes No    Take 500 mg by mouth Every Night.    nitroglycerin (NITROSTAT) 0.4 MG SL tablet   Yes No    Place 0.4 mg under the tongue Every 5 (Five) Minutes As Needed for Chest Pain. Take no more than 3 doses in 15 minutes.    Omega 3 1000 MG capsule   Yes No    Take  by mouth Daily.    pramipexole (MIRAPEX) 1 MG tablet   Yes No    Take 1 mg by mouth Every Night.    sertraline (ZOLOFT) 100 MG tablet   Yes No    Take 100 mg by mouth Daily.    VITAMIN B1-B12 IM   Yes No    Inject  into the appropriate muscle as directed by prescriber Every 30 (Thirty) Days.        Objective     Vital Signs:  Temp:  [97.3 °F (36.3 °C)] 97.3 °F (36.3 °C)  Heart Rate:  [71-84] 71  Resp:  [17] 17  BP: (132-147)/(67-77) 137/67    Mean Arterial Pressure (Non-Invasive) for the past 24 hrs (Last 3 readings):   Noninvasive MAP (mmHg)   03/14/23 1640 102   03/14/23 1620 100    03/14/23 1600 106     SpO2:  [95 %-97 %] 95 %  on   ;   Device (Oxygen Therapy): room air  Body mass index is 25.79 kg/m².    Wt Readings from Last 3 Encounters:   03/14/23 64 kg (141 lb)   03/10/21 68.7 kg (151 lb 6.4 oz)   01/09/20 68.5 kg (151 lb)      ----------------------------------------------------------------------------------------------------------------------  Physical Exam  Constitutional:       General: She is not in acute distress.  Cardiovascular:      Rate and Rhythm: Normal rate. Rhythm irregular.      Heart sounds: Murmur (consistent ejection murmur unchanged with inspiration or positional change) heard.   Pulmonary:      Effort: Pulmonary effort is normal.      Breath sounds: No wheezing or rales.   Abdominal:      General: Abdomen is flat.      Palpations: Abdomen is soft.      Tenderness: There is no abdominal tenderness (No suprapubic TTP). There is no right CVA tenderness or left CVA tenderness.   Musculoskeletal:      Right lower leg: No edema.      Left lower leg: No edema.   Skin:     General: Skin is warm and dry.      Capillary Refill: Capillary refill takes 2 to 3 seconds.   Neurological:      General: No focal deficit present.      Mental Status: She is alert and oriented to person, place, and time.   Psychiatric:         Mood and Affect: Mood normal.         Behavior: Behavior normal.       --------------------------------------------------------------------------------------------------------------------  LABS:    CBC and coagulation:  Results from last 7 days   Lab Units 03/14/23  1556 03/14/23  1519   PROCALCITONIN ng/mL  --  <0.02   LACTATE mmol/L 0.9  --    CRP mg/dL  --  <0.30   WBC 10*3/mm3  --  9.35   HEMOGLOBIN g/dL  --  11.4*   HEMATOCRIT %  --  37.2   MCV fL  --  79.3   MCHC g/dL  --  30.6*   PLATELETS 10*3/mm3  --  283   INR   --  1.01     Acid/base balance:      Renal and electrolytes:  Results from last 7 days   Lab Units 03/14/23  1519   SODIUM mmol/L 140   POTASSIUM  mmol/L 4.0   CHLORIDE mmol/L 103   CO2 mmol/L 26.0   BUN mg/dL 24*   CREATININE mg/dL 0.73   CALCIUM mg/dL 9.6   GLUCOSE mg/dL 109*     Estimated Creatinine Clearance: 57.6 mL/min (by C-G formula based on SCr of 0.73 mg/dL).    Liver and pancreatic function:  Results from last 7 days   Lab Units 03/14/23  1519   ALBUMIN g/dL 3.8   BILIRUBIN mg/dL <0.2   ALK PHOS U/L 67   AST (SGOT) U/L 15   ALT (SGPT) U/L <5     Endocrine function:  No results found for: HGBA1C  Point of care bedside glucose levels:      No results found for: TSH, FREET4  Cardiac:  Results from last 7 days   Lab Units 03/14/23  1719 03/14/23  1519   HSTROP T ng/L 17* 18*       Cultures:  Lab Results   Component Value Date    COLORU Dark Yellow (A) 03/14/2023    CLARITYU Cloudy (A) 03/14/2023    PHUR >=9.0 (H) 03/14/2023    GLUCOSEU Negative 03/14/2023    KETONESU Negative 03/14/2023    BLOODU Negative 03/14/2023    NITRITEU Positive (A) 03/14/2023    LEUKOCYTESUR Moderate (2+) (A) 03/14/2023    BILIRUBINUR Negative 03/14/2023    UROBILINOGEN 1.0 E.U./dL 03/14/2023    RBCUA 6-12 (A) 03/14/2023    WBCUA Too Numerous to Count (A) 03/14/2023    BACTERIA 4+ (A) 03/14/2023     Microbiology Results (last 10 days)     Procedure Component Value - Date/Time    COVID-19 and FLU A/B PCR - Swab, Nasopharynx [[id number]]  (Normal) Collected: 03/14/23 1554    Lab Status: Final result Specimen: Swab from Nasopharynx Updated: 03/14/23 1631     COVID19 Not Detected     Influenza A PCR Not Detected     Influenza B PCR Not Detected    Narrative:      Fact sheet for providers: https://www.fda.gov/media/000565/download    Fact sheet for patients: https://www.fda.gov/media/044897/download    Test performed by PCR.          No results found for: PREGTESTUR, PREGSERUM, HCG, HCGQUANT  Pain Management Panel    There is no flowsheet data to display.         I have personally looked at the labs and they are summarized  above.  ----------------------------------------------------------------------------------------------------------------------  Detailed radiology reports for the last 24 hours:    Imaging Results (Last 24 Hours)     Procedure Component Value Units Date/Time    CT Head Without Contrast [[id number]] Collected: 03/14/23 1549     Updated: 03/14/23 1552    Narrative:      EXAM:    CT Head Without Intravenous Contrast     EXAM DATE:    3/14/2023 3:30 PM     CLINICAL HISTORY:    headache     TECHNIQUE:    Axial computed tomography images of the head/brain without intravenous  contrast.  Sagittal and coronal reformatted images were created and  reviewed.  This CT exam was performed using one or more of the following  dose reduction techniques:  automated exposure control, adjustment of  the mA and/or kV according to patient size, and/or use of iterative  reconstruction technique.     COMPARISON:    No relevant prior studies available.     FINDINGS:    Brain:  Unremarkable.  No hemorrhage.  No significant white matter  disease.  No edema.    Ventricles:  Unremarkable.  No ventriculomegaly.    Bones/joints:  Unremarkable.  No acute fracture.    Soft tissues:  Unremarkable.    Sinuses:  Unremarkable as visualized.  No acute sinusitis.    Mastoid air cells:  Unremarkable as visualized.  No mastoid effusion.       Impression:        Unremarkable exam demonstrating no CT evidence of acute intracranial  findings.     This report was finalized on 3/14/2023 3:50 PM by Dr. Magdaleno Matute MD.       XR Chest AP [[id number]] Collected: 03/14/23 1549     Updated: 03/14/23 1552    Narrative:      EXAM:    XR Chest, 1 View     EXAM DATE:    3/14/2023 3:30 PM     CLINICAL HISTORY:    chest pain     TECHNIQUE:    Frontal view of the chest.     COMPARISON:    03/09/2007     FINDINGS:    Lungs:  Chronic appearing lung changes.  No focal airspace disease  identified.    Pleural space:  Unremarkable.  No pneumothorax.    Heart:  Mild  cardiomegaly.    Mediastinum:  Large hiatal hernia.    Bones/joints:  Unremarkable.    Tubes, lines and devices:  Left cardiac pacer device.       Impression:      1.  No acute cardiopulmonary findings.  2.  Large hiatal hernia.  3.  Mild cardiac enlargement.     This report was finalized on 3/14/2023 3:49 PM by Dr. Magdaleno Matute MD.           Final impressions for the last 30 days of radiology reports:    CT Head Without Contrast    Result Date: 3/14/2023    Unremarkable exam demonstrating no CT evidence of acute intracranial findings.  This report was finalized on 3/14/2023 3:50 PM by Dr. Magdaleno Matute MD.      XR Chest AP    Result Date: 3/14/2023  1.  No acute cardiopulmonary findings. 2.  Large hiatal hernia. 3.  Mild cardiac enlargement.  This report was finalized on 3/14/2023 3:49 PM by Dr. Magdaleno Matute MD.        I have personally looked at the radiology images, my personal interpretation is as follows:    CXR reviewed without significant cardiomegaly or pleural effusions    Assessment & Plan       #SSS (tachy-shanon syndrome) s/p [facility] PM w/frequent symptomatic PVCs  -During exam tele noted to have frequent PVCs likely explaining her sx with transitory drop in cardiac output, unsure if patient having true bradycardia. Troponin without delta and sx not convincing for ischemic cause  -Will monitor closely on tele overnight, briefly discussed case with on-call cardiologist and agree with plan to monitor while titrating PO beta blocker  -To suppress PVCs, will increase betablocker to 25mg tartrate BID (was taking 25mg succinate daily) with dose this evening while monitoring PVC burden overnight. Will uptitrate as needed as frequently as q8hr  -Cardiology consult placed and device interrogation ordered for am    #Hx of non-obstructive septal hypertrophy  -Murmur on exam consistent and previous echos reviewed without low EF, mod mitral regurg, and mild to mod septal hypertrophy without outflow  obstruction  -Repeat TTE ordered (last echo 2021)    #Acute bacterial UTI  -Cont CTX daily for 5 days total abx, transition to po at discharge  -f/u urine cx    - - Chronic conditions - -     #HTN: Cont amlodipine  #HLD: Cont statin  #Insomnia/anxiety: Resume home elavil/sertraline  #Normocytic anemia: Chronic in nature, no evidence of acute blood loss  #Parkonsons Disease: Well managed, Resume Sinemet      VTE Prophylaxis:   Mechanical Order History:     None      Pharmalogical Order History:      Ordered     Dose Route Frequency Stop    Signed and Held  Enoxaparin Sodium (LOVENOX) syringe 40 mg         40 mg SC Daily --              The patient is high risk due to the following diagnoses/reasons:  SSS hx w/PVCs and PM, acute UTI    Admission Status:  I certify that this patient requires inpatient hospitalization for greater than 2 midnights in INPATIENT status.  I anticipate there to be the need for care which can only be reasonably provided in a hospital setting such as possible need for aggressive/expedited ancillary services and/or consultation services, IV medications, close physician monitoring, and/or procedures. In such, I feel patient’s risk for adverse outcomes and need for care warrant INPATIENT evaluation and predict the patient’s care encounter to likely last beyond 2 midnights.    Code Status and Medical Interventions:   Ordered at: 03/14/23 2048     Code Status (Patient has no pulse and is not breathing):    CPR (Attempt to Resuscitate)     Medical Interventions (Patient has pulse or is breathing):    Full Support        Disposition: Admit to tele obs    Sergio Parker MD  [facility]ist  03/14/23  19:40 EDT

## 2023-03-15 ENCOUNTER — APPOINTMENT (OUTPATIENT)
Dept: CARDIOLOGY | Facility: HOSPITAL | Age: 78
End: 2023-03-15
Payer: MEDICARE

## 2023-03-15 LAB
ANION GAP SERPL CALCULATED.3IONS-SCNC: 7.4 MMOL/L (ref 5–15)
BACTERIA BLD CULT: ABNORMAL
BASOPHILS # BLD AUTO: 0.05 10*3/MM3 (ref 0–0.2)
BASOPHILS NFR BLD AUTO: 0.6 % (ref 0–1.5)
BH CV ECHO MEAS - ACS: 1.9 CM
BH CV ECHO MEAS - AO MAX PG: 14.3 MMHG
BH CV ECHO MEAS - AO MEAN PG: 8 MMHG
BH CV ECHO MEAS - AO ROOT DIAM: 3.5 CM
BH CV ECHO MEAS - AO V2 MAX: 189 CM/SEC
BH CV ECHO MEAS - AO V2 VTI: 36.2 CM
BH CV ECHO MEAS - AVA(I,D): 3.4 CM2
BH CV ECHO MEAS - EDV(CUBED): 28.1 ML
BH CV ECHO MEAS - EDV(MOD-SP4): 36.4 ML
BH CV ECHO MEAS - EF(MOD-SP4): 73 %
BH CV ECHO MEAS - ESV(CUBED): 14.6 ML
BH CV ECHO MEAS - ESV(MOD-SP4): 9.8 ML
BH CV ECHO MEAS - FS: 19.6 %
BH CV ECHO MEAS - IVS/LVPW: 0.99 CM
BH CV ECHO MEAS - IVSD: 1.49 CM
BH CV ECHO MEAS - LA DIMENSION: 3.7 CM
BH CV ECHO MEAS - LAT PEAK E' VEL: 6 CM/SEC
BH CV ECHO MEAS - LV DIASTOLIC VOL/BSA (35-75): 21.8 CM2
BH CV ECHO MEAS - LV MASS(C)D: 160.6 GRAMS
BH CV ECHO MEAS - LV MAX PG: 10.6 MMHG
BH CV ECHO MEAS - LV MEAN PG: 5 MMHG
BH CV ECHO MEAS - LV SYSTOLIC VOL/BSA (12-30): 5.9 CM2
BH CV ECHO MEAS - LV V1 MAX: 163 CM/SEC
BH CV ECHO MEAS - LV V1 VTI: 39.5 CM
BH CV ECHO MEAS - LVIDD: 3 CM
BH CV ECHO MEAS - LVIDS: 2.45 CM
BH CV ECHO MEAS - LVOT AREA: 3.1 CM2
BH CV ECHO MEAS - LVOT DIAM: 2 CM
BH CV ECHO MEAS - LVPWD: 1.51 CM
BH CV ECHO MEAS - MED PEAK E' VEL: 4.4 CM/SEC
BH CV ECHO MEAS - MR MAX PG: 103.6 MMHG
BH CV ECHO MEAS - MR MAX VEL: 509 CM/SEC
BH CV ECHO MEAS - MR MEAN PG: 46 MMHG
BH CV ECHO MEAS - MR MEAN VEL: 294 CM/SEC
BH CV ECHO MEAS - MR VTI: 82.8 CM
BH CV ECHO MEAS - MV A MAX VEL: 144 CM/SEC
BH CV ECHO MEAS - MV E MAX VEL: 130 CM/SEC
BH CV ECHO MEAS - MV E/A: 0.9
BH CV ECHO MEAS - PA ACC TIME: 0.06 SEC
BH CV ECHO MEAS - PA PR(ACCEL): 52 MMHG
BH CV ECHO MEAS - RAP SYSTOLE: 10 MMHG
BH CV ECHO MEAS - RVSP: 34.6 MMHG
BH CV ECHO MEAS - SI(MOD-SP4): 15.9 ML/M2
BH CV ECHO MEAS - SV(LVOT): 124.1 ML
BH CV ECHO MEAS - SV(MOD-SP4): 26.6 ML
BH CV ECHO MEAS - TR MAX PG: 24.6 MMHG
BH CV ECHO MEAS - TR MAX VEL: 248 CM/SEC
BH CV ECHO MEASUREMENTS AVERAGE E/E' RATIO: 25
BUN SERPL-MCNC: 23 MG/DL (ref 8–23)
BUN/CREAT SERPL: 36.5 (ref 7–25)
CALCIUM SPEC-SCNC: 9.2 MG/DL (ref 8.6–10.5)
CHLORIDE SERPL-SCNC: 105 MMOL/L (ref 98–107)
CO2 SERPL-SCNC: 26.6 MMOL/L (ref 22–29)
CREAT SERPL-MCNC: 0.63 MG/DL (ref 0.57–1)
DEPRECATED RDW RBC AUTO: 44.3 FL (ref 37–54)
EGFRCR SERPLBLD CKD-EPI 2021: 92.1 ML/MIN/1.73
EOSINOPHIL # BLD AUTO: 0.25 10*3/MM3 (ref 0–0.4)
EOSINOPHIL NFR BLD AUTO: 3.1 % (ref 0.3–6.2)
ERYTHROCYTE [DISTWIDTH] IN BLOOD BY AUTOMATED COUNT: 15.6 % (ref 12.3–15.4)
GLUCOSE SERPL-MCNC: 101 MG/DL (ref 65–99)
HCT VFR BLD AUTO: 35.5 % (ref 34–46.6)
HGB BLD-MCNC: 10.5 G/DL (ref 12–15.9)
IMM GRANULOCYTES # BLD AUTO: 0.01 10*3/MM3 (ref 0–0.05)
IMM GRANULOCYTES NFR BLD AUTO: 0.1 % (ref 0–0.5)
LEFT ATRIUM VOLUME INDEX: 28.4 ML/M2
LYMPHOCYTES # BLD AUTO: 3.15 10*3/MM3 (ref 0.7–3.1)
LYMPHOCYTES NFR BLD AUTO: 39.1 % (ref 19.6–45.3)
MAXIMAL PREDICTED HEART RATE: 144 BPM
MCH RBC QN AUTO: 23.4 PG (ref 26.6–33)
MCHC RBC AUTO-ENTMCNC: 29.6 G/DL (ref 31.5–35.7)
MCV RBC AUTO: 79.1 FL (ref 79–97)
MONOCYTES # BLD AUTO: 0.8 10*3/MM3 (ref 0.1–0.9)
MONOCYTES NFR BLD AUTO: 9.9 % (ref 5–12)
NEUTROPHILS NFR BLD AUTO: 3.8 10*3/MM3 (ref 1.7–7)
NEUTROPHILS NFR BLD AUTO: 47.2 % (ref 42.7–76)
NRBC BLD AUTO-RTO: 0 /100 WBC (ref 0–0.2)
PLATELET # BLD AUTO: 254 10*3/MM3 (ref 140–450)
PMV BLD AUTO: 10 FL (ref 6–12)
POTASSIUM SERPL-SCNC: 3.8 MMOL/L (ref 3.5–5.2)
QT INTERVAL: 398 MS
QTC INTERVAL: 444 MS
RBC # BLD AUTO: 4.49 10*6/MM3 (ref 3.77–5.28)
SODIUM SERPL-SCNC: 139 MMOL/L (ref 136–145)
STRESS TARGET HR: 122 BPM
WBC NRBC COR # BLD: 8.06 10*3/MM3 (ref 3.4–10.8)

## 2023-03-15 PROCEDURE — G0378 HOSPITAL OBSERVATION PER HR: HCPCS

## 2023-03-15 PROCEDURE — 99232 SBSQ HOSP IP/OBS MODERATE 35: CPT | Performed by: HOSPITALIST

## 2023-03-15 PROCEDURE — 93306 TTE W/DOPPLER COMPLETE: CPT

## 2023-03-15 PROCEDURE — 85025 COMPLETE CBC W/AUTO DIFF WBC: CPT | Performed by: STUDENT IN AN ORGANIZED HEALTH CARE EDUCATION/TRAINING PROGRAM

## 2023-03-15 PROCEDURE — 96366 THER/PROPH/DIAG IV INF ADDON: CPT

## 2023-03-15 PROCEDURE — 25010000002 CEFTRIAXONE PER 250 MG: Performed by: STUDENT IN AN ORGANIZED HEALTH CARE EDUCATION/TRAINING PROGRAM

## 2023-03-15 PROCEDURE — 99222 1ST HOSP IP/OBS MODERATE 55: CPT | Performed by: INTERNAL MEDICINE

## 2023-03-15 PROCEDURE — 93306 TTE W/DOPPLER COMPLETE: CPT | Performed by: SPECIALIST

## 2023-03-15 PROCEDURE — 80048 BASIC METABOLIC PNL TOTAL CA: CPT | Performed by: STUDENT IN AN ORGANIZED HEALTH CARE EDUCATION/TRAINING PROGRAM

## 2023-03-15 RX ORDER — PHENOBARBITAL, HYOSCYAMINE SULFATE, ATROPINE SULFATE AND SCOPOLAMINE HYDROBROMIDE .0194; .1037; 16.2; .0065 MG/1; MG/1; MG/1; MG/1
2 TABLET ORAL ONCE
Status: COMPLETED | OUTPATIENT
Start: 2023-03-15 | End: 2023-03-15

## 2023-03-15 RX ORDER — CALCIUM CARBONATE 200(500)MG
3 TABLET,CHEWABLE ORAL DAILY PRN
Status: DISCONTINUED | OUTPATIENT
Start: 2023-03-15 | End: 2023-03-18 | Stop reason: HOSPADM

## 2023-03-15 RX ORDER — LIDOCAINE HYDROCHLORIDE 20 MG/ML
15 SOLUTION OROPHARYNGEAL ONCE
Status: COMPLETED | OUTPATIENT
Start: 2023-03-15 | End: 2023-03-15

## 2023-03-15 RX ORDER — ALUMINA, MAGNESIA, AND SIMETHICONE 2400; 2400; 240 MG/30ML; MG/30ML; MG/30ML
15 SUSPENSION ORAL ONCE
Status: COMPLETED | OUTPATIENT
Start: 2023-03-15 | End: 2023-03-15

## 2023-03-15 RX ADMIN — LIDOCAINE HYDROCHLORIDE 15 ML: 20 SOLUTION ORAL; TOPICAL at 22:56

## 2023-03-15 RX ADMIN — ASPIRIN 81 MG: 81 TABLET, CHEWABLE ORAL at 09:21

## 2023-03-15 RX ADMIN — Medication 10 ML: at 20:09

## 2023-03-15 RX ADMIN — AMLODIPINE BESYLATE 5 MG: 5 TABLET ORAL at 08:46

## 2023-03-15 RX ADMIN — PRAMIPEXOLE DIHYDROCHLORIDE 0.5 MG: 0.12 TABLET ORAL at 01:47

## 2023-03-15 RX ADMIN — PHENOBARBITAL, HYOSCYAMINE SULFATE, ATROPINE SULFATE, SCOPOLAMINE HYDROBROMIDE 32.4 MG: 16.2; .1037; .0194; .0065 TABLET ORAL at 22:56

## 2023-03-15 RX ADMIN — CEFTRIAXONE 1 G: 1 INJECTION, POWDER, FOR SOLUTION INTRAMUSCULAR; INTRAVENOUS at 18:25

## 2023-03-15 RX ADMIN — CARBIDOPA AND LEVODOPA 1 TABLET: 25; 250 TABLET ORAL at 09:21

## 2023-03-15 RX ADMIN — ATORVASTATIN CALCIUM 20 MG: 20 TABLET, FILM COATED ORAL at 08:47

## 2023-03-15 RX ADMIN — SERTRALINE 100 MG: 50 TABLET, FILM COATED ORAL at 08:46

## 2023-03-15 RX ADMIN — CARBIDOPA AND LEVODOPA 1 TABLET: 25; 250 TABLET ORAL at 12:51

## 2023-03-15 RX ADMIN — PANTOPRAZOLE SODIUM 40 MG: 40 TABLET, DELAYED RELEASE ORAL at 18:25

## 2023-03-15 RX ADMIN — AMITRIPTYLINE HYDROCHLORIDE 25 MG: 25 TABLET, FILM COATED ORAL at 01:47

## 2023-03-15 RX ADMIN — ANTACID TABLETS 3 TABLET: 500 TABLET, CHEWABLE ORAL at 20:50

## 2023-03-15 RX ADMIN — METOPROLOL TARTRATE 25 MG: 25 TABLET, FILM COATED ORAL at 20:10

## 2023-03-15 RX ADMIN — METOPROLOL TARTRATE 25 MG: 25 TABLET, FILM COATED ORAL at 08:46

## 2023-03-15 RX ADMIN — Medication 10 ML: at 08:48

## 2023-03-15 RX ADMIN — CARBIDOPA AND LEVODOPA 1 TABLET: 25; 250 TABLET ORAL at 01:47

## 2023-03-15 RX ADMIN — ACETAMINOPHEN 500 MG: 500 TABLET ORAL at 20:11

## 2023-03-15 RX ADMIN — AMITRIPTYLINE HYDROCHLORIDE 25 MG: 25 TABLET, FILM COATED ORAL at 20:10

## 2023-03-15 RX ADMIN — CARBIDOPA AND LEVODOPA 1 TABLET: 25; 250 TABLET ORAL at 18:25

## 2023-03-15 RX ADMIN — CARBIDOPA AND LEVODOPA 1 TABLET: 25; 250 TABLET ORAL at 20:10

## 2023-03-15 RX ADMIN — ALUMINUM HYDROXIDE, MAGNESIUM HYDROXIDE, AND DIMETHICONE 15 ML: 400; 400; 40 SUSPENSION ORAL at 22:56

## 2023-03-15 RX ADMIN — PRAMIPEXOLE DIHYDROCHLORIDE 0.5 MG: 0.12 TABLET ORAL at 20:10

## 2023-03-15 RX ADMIN — PANTOPRAZOLE SODIUM 40 MG: 40 TABLET, DELAYED RELEASE ORAL at 08:47

## 2023-03-15 NOTE — PAYOR COMM NOTE
"    [address] [address]  Phone: [phone]  NPI: [id number]     Tax ID: [id number]      Olive Foote Rn  [employer]  Phone: [phone]  Fax:      [phone]  E mail: [email or web address]    Initiate observation authorization   ICD R07.9   N39.0   CPT 06493      Layne Rodriguez (77 y.o. Female)     Date of Birth   1945    Social Security Number       Address   [address] [place] [address] [address] [place] [id number]    Home Phone   [phone]    MRN   [id number]       [employer]   [employer]    Marital Status                               Admission Date   3/14/23    Admission Type   Emergency    Admitting Provider   Sergio Parker MD    Attending Provider   Clara Woodard MD    Department, Room/Bed   [address], [address]10/[address]S       Discharge Date       Discharge Disposition       Discharge Destination                               Attending Provider: Clara Woodard MD    Allergies: Codeine, Reglan [Metoclopramide]    Isolation: None   Infection: COVID Screen (preop/placement) (02/18/21)   Code Status: CPR    Ht: 157.5 cm (62\")   Wt: 65.8 kg (145 lb 1.6 oz)    Admission Cmt: None   Principal Problem: Chest pain, unspecified type [R07.9]                 Active Insurance as of 3/14/2023     Primary Coverage     Payor Plan Insurance Group Employer/Plan Group    [employer] [place] MEDICARE REPLACEMENT [facility]      Payor Plan Address Payor Plan Phone Number Payor Plan Fax Number Effective Dates     [address] [address] [phone]  8/31/2020 - None Entered    [facility] [id number]       Subscriber Name Subscriber Birth Date Member ID       Layne Rodriguez 1945 [id number]                 Emergency Contacts      (Rel.) Home Phone Work Phone Mobile Phone    ZEESHAN SARMIENTO (Daughter) [phone] -- --            "

## 2023-03-15 NOTE — CONSULTS
Patient information:  Date of Admit: 3/14/2023  Date of Consult: 03/15/23  Hospitalist:No ref. provider found  Kreis, Samuel Duane, MD  No ref. provider found  0  MRN:  [id number]  Visit Number:  [id number]    Assessment      1. Chest pains with some typical and some atypical features for CCS class III-IV angina.  2. ASCVD, status post previous PCI/stenting at [facility] (details unavailable).  3. Status post permanent dual-chamber pacemaker implantation by  with generator change in 2019 with [employer] medical device.  4. Intermittent palpitations with dizziness but no syncope.  5. Mildly elevated troponin of 18 with a flat trend which is of questionable significance.  6. History of parkinsonism with limited functional capacity.    Recommendations     1. Continue with aspirin, atorvastatin and metoprolol succinate  2. Evaluate further with Lexiscan sestamibi study in a.m. as patient had breakfast and coffee this morning  3. We will get the pacemaker interrogated of to see if she is having any tachyarrhythmias causing her dizziness.    Reason for consultation: Frequent symptomatic PVCs with near syncopal episode.    Subjective   ADMISSION INFORMATION:  Subjective     History of Present Illness    Layne Rodriguez is a 77 y.o. female with a past medical history significant for sick tachy-bradycardia syndrome status post [employer] pacemaker implantation with generator change in December 2019, ASCVD status post 3 stents (in 2010 per Dr. Jin), essential hypertension, dyslipidemia, anemia, arthritis, GERD with Monae's esophagus, COPD, history of Parkinson's with degenerative changes of the brain per patient, and osteoporosis. Patient presented to [facility] ([facility] emergency room (ER) on 3/14/2023 with complaints of right-sided headache and chest pain.  Patient reported her headache started in the evening 03/13/2023 and that she had noted some intermittent drawing up in her right  "hand.  She reported that her chest pain was earlier in the day on 03/14/2023 and described it as mild in the substernal area but denied pain upon presentation to ER.  Patient denied any alleviating or aggravating factors.  Patient denied any treatment measures.  Creatinine 0.73 and potassium was 4.0.  EKG revealed sinus rhythm with 75 bpm with multiple PVCs but no significant ST deviation or T wave abnormalities concerning for acute ischemia were noted.  Initial high sensitive troponin T was 18 and repeat was 17 with a delta of -1.  Single view chest x-ray showed no acute cardiopulmonary findings other than mild cardiac enlargement.  CT of the head was unremarkable for any evidence of acute intracranial findings.  Patient was admitted and Cardiology has been consulted for further evaluation and management.  Patient's local cardiology is with [facility] but it appears her follow-ups have been lost and her last visit in the office was 03/10/2021.   Patient is lying in bed resting quietly.  No acute distress noted at this time.  Patient reports that she is has had fluttering intermittently for approximately 6 months associated with dizziness, chest pain, and shortness of breath.  Patient reports that her chest pain does radiate into her left jaw and neck and left upper extremity with intermittent radiation to the right upper extremity as well.  Patient reports these episodes occur 1-2 times per day.  Patient reports she has been mostly bed ridden in the last 6 months because she is a \"fall risk\".  Patient states, \"I get the feeling so bad, I go back to bed quickly after getting up.\"  Patient denies sensation of room spinning or ear ringing.  Patient describes her dizziness as \"everything goes black\".  Patient denies full syncopal episodes.  Patient does not have any assistant devices to help her ambulate.  She also reports bilateral foot pain with a burning hot alternating with a cold sensation.  She reports she " is a borderline diabetic and has been diagnosed with peripheral neuropathy but is currently not taking any medicine to help treat this.  Patient denies any nausea, vomiting, or diaphoresis.  Patient does report she had stents placed in 2010 by Dr. Jin.  Patient's daughter and 2 granddaughters are present at bedside.    Cardiac risk factors:arteriosclerotic heart disease, hypercholesterolemia and hypertension      Last Echo: Results for orders placed during the hospital encounter of 03/25/21    Adult Transthoracic Echo Complete W/ Cont if Necessary Per Protocol    Interpretation Summary  · Left ventricular wall thickness is consistent with mild to moderate septal asymmetric hypertrophy.  · Left ventricular ejection fraction appears to be 66 - 70%.  · Left ventricular diastolic function is consistent with (grade I) impaired relaxation.  · The aortic valve is structurally normal with no stenosis present. Mild aortic valve regurgitation is present.  · Mild mitral annular calcification is present. Moderate mitral valve regurgitation is present. No significant mitral valve stenosis is present.  · There is a trivial pericardial effusion.     Last Stress: No results found for this or any previous visit.    Last Cath: No results found for this or any previous visit.     Pacemaker information:      Past Medical History:   Diagnosis Date   • Anemia    • Arthritis    • ASCVD (arteriosclerotic cardiovascular disease)    • Monae's esophagus    • COPD (chronic obstructive pulmonary disease) (HCC)    • Coronary artery disease    • Dyslipidemia    • Essential hypertension    • GERD (gastroesophageal reflux disease)    • Myocardial infarction (HCC)    • Osteoporosis      Past Surgical History:   Procedure Laterality Date   • BACK SURGERY     • CARDIAC ELECTROPHYSIOLOGY PROCEDURE N/A 12/3/2019    Procedure: PPM battery change;  Surgeon: Flako Pavon MD;  Location: [facility] INVASIVE LOCATION;  Service: Cardiology   •  CORONARY STENT PLACEMENT      x 3   • HYSTERECTOMY     • INSERT / REPLACE / REMOVE PACEMAKER     • PACEMAKER IMPLANTATION       Family History   Problem Relation Age of Onset   • Stroke Mother    • Heart disease Mother    • Stroke Father    • Hypertension Brother    • Hypertension Sister    • Stroke Sister    • Hypertension Sister    • Hypertension Sister    • Hypertension Sister    • Hypertension Brother    • Hypertension Brother    • Hypertension Brother      Social History     Tobacco Use   • Smoking status: Never   • Smokeless tobacco: Never   Vaping Use   • Vaping Use: Never used   Substance Use Topics   • Alcohol use: No   • Drug use: No     Medications Prior to Admission   Medication Sig Dispense Refill Last Dose   • amitriptyline (ELAVIL) 25 MG tablet Take 1 tablet by mouth Every Night.   3/13/2023 at 2000   • amLODIPine (NORVASC) 5 MG tablet Take 1 tablet by mouth Daily.   3/14/2023 at 800   • aspirin 81 MG chewable tablet Chew 1 tablet Daily.   3/14/2023 at 0800   • atorvastatin (LIPITOR) 20 MG tablet Take 1 tablet by mouth Daily.   3/14/2023 at 0800   • carbidopa-levodopa (SINEMET)  MG per tablet Take 1 tablet by mouth 4 (Four) Times a Day.   3/14/2023 at 1200   • dexlansoprazole (DEXILANT) 60 MG capsule Take 1 capsule by mouth Daily.   3/14/2023 at 0800   • gabapentin (NEURONTIN) 100 MG capsule Take 1 capsule by mouth 2 (Two) Times a Day As Needed.   Past Week   • loratadine (CLARITIN) 10 MG tablet Take 1 tablet by mouth Daily.   3/14/2023 at 0800   • metoprolol succinate XL (TOPROL-XL) 50 MG 24 hr tablet Take 1 tablet by mouth Daily.   3/14/2023 at 0800   • nitroglycerin (NITROSTAT) 0.4 MG SL tablet Place 1 tablet under the tongue Every 5 (Five) Minutes As Needed for Chest Pain. Take no more than 3 doses in 15 minutes.   Past Month   • Omega 3 1000 MG capsule Take 1,000 mg by mouth Daily.   3/14/2023   • omeprazole (priLOSEC) 20 MG capsule Take 1 capsule by mouth 2 (Two) Times a Day.   3/14/2023  "at 0800   • pramipexole (MIRAPEX) 1 MG tablet Take 0.5 mg by mouth Every Night.   3/13/2023 at 2000   • sertraline (ZOLOFT) 100 MG tablet Take 1 tablet by mouth Daily.   3/14/2023     Allergies:  Codeine and Reglan [metoclopramide]    Review of Systems   Constitutional: Negative for diaphoresis.   Eyes: Positive for visual disturbance.        Patient reports, \"everything goes black\".   Respiratory: Positive for shortness of breath.    Cardiovascular: Positive for chest pain.   Gastrointestinal: Negative for nausea and vomiting.   Genitourinary: Negative.    Musculoskeletal:        Bilateral feet with an alternating sensation of burning hot and cold.  Patient also reports that she has Parkinson disease and this makes her have difficulty with walking and fall risk.  She reports she does not have any walking assistant devices to help her remain steady on her feet.   Skin: Negative for color change.   Neurological: Positive for dizziness and tremors. Negative for headaches.        Near syncope.  Patient reports she does have Parkinson disease.   Psychiatric/Behavioral: Negative for agitation, behavioral problems and confusion.       Objective     Objective      Vital Signs  Temp:  [97.3 °F (36.3 °C)-98.8 °F (37.1 °C)] 98.5 °F (36.9 °C)  Heart Rate:  [60-84] 64  Resp:  [17-18] 18  BP: (122-151)/(60-89) 148/73  Vital Signs (last 72 hrs)       03/12 0700  03/13 0659 03/13 0700  03/14 0659 03/14 0700  03/15 0659 03/15 0700  03/15 0805   Most Recent      Temp (°F)     97.3 -  98.8       98.5 (36.9) 03/15 0655    Heart Rate     60 -  84       64 03/15 0655    Resp     17 -  18       18 03/15 0655    BP     122/64 -  151/78       148/73 03/15 0655    SpO2 (%)     93 -  97       93 03/15 0655        Body mass index is 26.54 kg/m².    Intake/Output Summary (Last 24 hours) at 3/15/2023 0805  Last data filed at 3/15/2023 0623  Gross per 24 hour   Intake 100 ml   Output 550 ml   Net -450 ml     Physical Exam  Constitutional:       " Appearance: Normal appearance.   HENT:      Head: Normocephalic.      Nose: Nose normal.      Mouth/Throat:      Mouth: Mucous membranes are moist.      Pharynx: Oropharynx is clear.   Eyes:      Conjunctiva/sclera: Conjunctivae normal.   Cardiovascular:      Rate and Rhythm: Normal rate and regular rhythm.      Pulses: Normal pulses.      Heart sounds: Murmur heard.      Comments: Systolic murmur grade 3/6 heard best at the left lower sternal border.  Pulmonary:      Effort: Pulmonary effort is normal.      Breath sounds: Normal breath sounds.   Abdominal:      General: Bowel sounds are normal.   Musculoskeletal:         General: Normal range of motion.      Cervical back: Normal range of motion.      Right lower leg: No edema.      Left lower leg: No edema.   Skin:     General: Skin is warm and dry.   Neurological:      General: No focal deficit present.      Mental Status: She is oriented to person, place, and time.      Motor: Weakness present.      Comments: Left hand , push, and pull is weaker than in the right.   Psychiatric:         Mood and Affect: Mood normal.         Behavior: Behavior normal.         Results review     Results Review:    I have reviewed the patient's new clinical results.  Results from last 7 days   Lab Units 03/14/23  1719 03/14/23  1519   HSTROP T ng/L 17* 18*     Results from last 7 days   Lab Units 03/15/23  0510 03/14/23  1519   WBC 10*3/mm3 8.06 9.35   HEMOGLOBIN g/dL 10.5* 11.4*   PLATELETS 10*3/mm3 254 283     Results from last 7 days   Lab Units 03/15/23  0510 03/14/23  1519   SODIUM mmol/L 139 140   POTASSIUM mmol/L 3.8 4.0   CHLORIDE mmol/L 105 103   CO2 mmol/L 26.6 26.0   BUN mg/dL 23 24*   CREATININE mg/dL 0.63 0.73   CALCIUM mg/dL 9.2 9.6   GLUCOSE mg/dL 101* 109*   ALT (SGPT) U/L  --  <5   AST (SGOT) U/L  --  15     Lab Results   Component Value Date    INR 1.01 03/14/2023     Lab Results   Component Value Date    MG 2.0 11/20/2019     Lab Results   Component Value  Date    TSH 0.999 2023      No results found for: PROBNP  Pain Management Panel    There is no flowsheet data to display.         EC2023        ECG/EMG Results (last 24 hours)     Procedure Component Value Units Date/Time    ECG 12 Lead Chest Pain [[id number]] Collected: 23 1510     Updated: 23 1529     QT Interval 398 ms      QTC Interval 444 ms     Narrative:      Test Reason : Chest Pain  Blood Pressure :   */*   mmHG  Vent. Rate :  75 BPM     Atrial Rate :  75 BPM     P-R Int : 154 ms          QRS Dur :  92 ms      QT Int : 398 ms       P-R-T Axes :  39 -45  32 degrees     QTc Int : 444 ms    Sinus rhythm with premature ventricular complexes or fusion complexes  Left axis deviation  Septal infarct , age undetermined  Abnormal ECG  No previous ECGs available    Referred By: FORD           Confirmed By:           TELEMETRY: SR 60's with occasional PVCs              Imaging Results (Last 72 Hours)     Procedure Component Value Units Date/Time    CT Head Without Contrast [[id number]] Collected: 23 1549     Updated: 23 1552    Narrative:      EXAM:    CT Head Without Intravenous Contrast     EXAM DATE:    3/14/2023 3:30 PM     CLINICAL HISTORY:    headache     TECHNIQUE:    Axial computed tomography images of the head/brain without intravenous  contrast.  Sagittal and coronal reformatted images were created and  reviewed.  This CT exam was performed using one or more of the following  dose reduction techniques:  automated exposure control, adjustment of  the mA and/or kV according to patient size, and/or use of iterative  reconstruction technique.     COMPARISON:    No relevant prior studies available.     FINDINGS:    Brain:  Unremarkable.  No hemorrhage.  No significant white matter  disease.  No edema.    Ventricles:  Unremarkable.  No ventriculomegaly.    Bones/joints:  Unremarkable.  No acute fracture.    Soft tissues:  Unremarkable.    Sinuses:  Unremarkable as visualized.   No acute sinusitis.    Mastoid air cells:  Unremarkable as visualized.  No mastoid effusion.       Impression:        Unremarkable exam demonstrating no CT evidence of acute intracranial  findings.     This report was finalized on 3/14/2023 3:50 PM by Dr. Magdaleno Matute MD.       XR Chest AP [[id number]] Collected: 03/14/23 1549     Updated: 03/14/23 1552    Narrative:      EXAM:    XR Chest, 1 View     EXAM DATE:    3/14/2023 3:30 PM     CLINICAL HISTORY:    chest pain     TECHNIQUE:    Frontal view of the chest.     COMPARISON:    03/09/2007     FINDINGS:    Lungs:  Chronic appearing lung changes.  No focal airspace disease  identified.    Pleural space:  Unremarkable.  No pneumothorax.    Heart:  Mild cardiomegaly.    Mediastinum:  Large hiatal hernia.    Bones/joints:  Unremarkable.    Tubes, lines and devices:  Left cardiac pacer device.       Impression:      1.  No acute cardiopulmonary findings.  2.  Large hiatal hernia.  3.  Mild cardiac enlargement.     This report was finalized on 3/14/2023 3:49 PM by Dr. Magdaleno Matute MD.             CURRENT MEDICATIONS:  amitriptyline, 25 mg, Oral, Nightly  amLODIPine, 5 mg, Oral, Daily  aspirin, 81 mg, Oral, Daily  atorvastatin, 20 mg, Oral, Daily  carbidopa-levodopa, 1 tablet, Oral, 4x Daily  cefTRIAXone, 1 g, Intravenous, Q24H  enoxaparin, 40 mg, Subcutaneous, Daily  metoprolol tartrate, 25 mg, Oral, Q12H  pantoprazole, 40 mg, Oral, BID AC  pramipexole, 0.5 mg, Oral, Nightly  senna-docusate sodium, 2 tablet, Oral, Nightly  sertraline, 100 mg, Oral, Daily  sodium chloride, 10 mL, Intravenous, Q12H           I have discussed my impression and recommendations with the patient and family.      Thank you very much for asking us to be involved in this patient's care.  We will follow along with you.    Electronically signed by JOSUÉ Webber, 03/15/23, 1:01 PM EDT.    Electronically signed by Flako Pavon MD, 03/15/23, 4:51 PM EDT.            Please note that  portions of this note were copied and has been reviewed and is accurate as of date.      Please note that portions of this note were completed with a voice recognition program.

## 2023-03-15 NOTE — PLAN OF CARE
"Pt admitted last night. A&Ox4. Oriented pt to room and call light. Medications administered per orders. Some complaints of foot pain from neuropathy, pt simply requested tylenol for this, tylenol administered. Pt been sleeping well. No other complaints or symptoms of distress noted.     Pt c/o \"bladder feeling full\" and hurting at 0623, bladder scan showed 500 mL being retained. Performed in and out catheterization- drained 550 mL cloudy yellow urine. Pt tolerated well, stated she felt much better after catheterization.   "

## 2023-03-15 NOTE — PROGRESS NOTES
"    [facility] HOSPITALIST PROGRESS NOTE     Patient Identification:  Name:  Layne Rodriguez  Age:  77 y.o.  Sex:  female  :  1945  MRN:  [id number]  Visit Number:  [id number]  Primary Care Provider:  Kreis, Samuel Duane, MD    Length of stay:  0    Subjective: Patient seen and examined, patient denies recurrence of chest pain, reported \"rare palpitations \", review of telemetry since arrival to the floors I do not see any arrhythmia or bigeminy.    Chief Complaint: Dyspnea on exertion palpitation  ----------------------------------------------------------------------------------------------------------------------  Current Hospital Meds:  amitriptyline, 25 mg, Oral, Nightly  amLODIPine, 5 mg, Oral, Daily  aspirin, 81 mg, Oral, Daily  atorvastatin, 20 mg, Oral, Daily  carbidopa-levodopa, 1 tablet, Oral, 4x Daily  cefTRIAXone, 1 g, Intravenous, Q24H  enoxaparin, 40 mg, Subcutaneous, Daily  metoprolol tartrate, 25 mg, Oral, Q12H  pantoprazole, 40 mg, Oral, BID AC  pramipexole, 0.5 mg, Oral, Nightly  senna-docusate sodium, 2 tablet, Oral, Nightly  sertraline, 100 mg, Oral, Daily  sodium chloride, 10 mL, Intravenous, Q12H         ----------------------------------------------------------------------------------------------------------------------  Vital Signs:  Temp:  [97.3 °F (36.3 °C)-98.8 °F (37.1 °C)] 98.4 °F (36.9 °C)  Heart Rate:  [60-84] 60  Resp:  [17-18] 18  BP: (122-151)/(60-89) 134/72       Tele: Sinus rhythm 68 bpm      23  1452 03/14/23  2037 03/15/23  0545   Weight: 64 kg (141 lb) 65.8 kg (145 lb 1.6 oz) 65.8 kg (145 lb 1.6 oz) (admit weight less than 24 hours)     Body mass index is 26.54 kg/m².    Intake/Output Summary (Last 24 hours) at 3/15/2023 1228  Last data filed at 3/15/2023 0900  Gross per 24 hour   Intake 340 ml   Output 550 ml   Net -210 ml     Diet: Cardiac Diets; Healthy Heart (2-3 Na+); Texture: Regular Texture (IDDSI 7); Fluid Consistency: Thin (IDDSI " 0)  ----------------------------------------------------------------------------------------------------------------------  Physical exam:  General: Comfortable,awake, alert, oriented to self, place, and time, well-developed and well-nourished.  No respiratory distress.    Skin:  Skin is warm and dry. No rash noted. No pallor.    HENT:  Head:  Normocephalic and atraumatic.  Mouth:  Moist mucous membranes.    Eyes:  Conjunctivae and EOM are normal.  Pupils are equal, round, and reactive to light.  No scleral icterus.    Neck:  Neck supple.  No JVD present.    Pulmonary/Chest:  No respiratory distress, no wheezes, no crackles, with normal breath sounds and good air movement.  Left-sided pacemaker  Cardiovascular:  Normal rate, regular rhythm and normal heart sounds with no murmur.  Abdominal:  Soft.  Bowel sounds are normal.  No distension and no tenderness.   Extremities:  No edema, no tenderness, and no deformity.  No red or swollen joints anywhere.  Strong pulses in all 4 extremities with no clubbing, no cyanosis, no edema.  Neurological:  Motor strength equal no obvious deficit, sensory grossly intact.   No cranial nerve deficit.  No tongue deviation.  No facial droop.  No slurred speech.    Genitourinary: No Sibley catheter  Back:  ----------------------------------------------------------------------------------------------------------------------  ----------------------------------------------------------------------------------------------------------------------  Results from last 7 days   Lab Units 03/14/23  1719 03/14/23  1519   HSTROP T ng/L 17* 18*     Results from last 7 days   Lab Units 03/15/23  0510 03/14/23  1556 03/14/23  1519   CRP mg/dL  --   --  <0.30   LACTATE mmol/L  --  0.9  --    WBC 10*3/mm3 8.06  --  9.35   HEMOGLOBIN g/dL 10.5*  --  11.4*   HEMATOCRIT % 35.5  --  37.2   MCV fL 79.1  --  79.3   MCHC g/dL 29.6*  --  30.6*   PLATELETS 10*3/mm3 254  --  283   INR   --   --  1.01         Results  from last 7 days   Lab Units 03/15/23  0510 03/14/23  1519   SODIUM mmol/L 139 140   POTASSIUM mmol/L 3.8 4.0   CHLORIDE mmol/L 105 103   CO2 mmol/L 26.6 26.0   BUN mg/dL 23 24*   CREATININE mg/dL 0.63 0.73   CALCIUM mg/dL 9.2 9.6   GLUCOSE mg/dL 101* 109*   ALBUMIN g/dL  --  3.8   BILIRUBIN mg/dL  --  <0.2   ALK PHOS U/L  --  67   AST (SGOT) U/L  --  15   ALT (SGPT) U/L  --  <5   Estimated Creatinine Clearance: 67.6 mL/min (by C-G formula based on SCr of 0.63 mg/dL).    No results found for: AMMONIA      No results found for: BLOODCX  Urine Culture   Date Value Ref Range Status   03/14/2023 >100,000 CFU/mL Escherichia coli (A)  Preliminary     No results found for: WOUNDCX  No results found for: STOOLCX    I have personally looked at the labs and they are summarized above.  ----------------------------------------------------------------------------------------------------------------------  Imaging Results (Last 24 Hours)     Procedure Component Value Units Date/Time    CT Head Without Contrast [[id number]] Collected: 03/14/23 1549     Updated: 03/14/23 1552    Narrative:      EXAM:    CT Head Without Intravenous Contrast     EXAM DATE:    3/14/2023 3:30 PM     CLINICAL HISTORY:    headache     TECHNIQUE:    Axial computed tomography images of the head/brain without intravenous  contrast.  Sagittal and coronal reformatted images were created and  reviewed.  This CT exam was performed using one or more of the following  dose reduction techniques:  automated exposure control, adjustment of  the mA and/or kV according to patient size, and/or use of iterative  reconstruction technique.     COMPARISON:    No relevant prior studies available.     FINDINGS:    Brain:  Unremarkable.  No hemorrhage.  No significant white matter  disease.  No edema.    Ventricles:  Unremarkable.  No ventriculomegaly.    Bones/joints:  Unremarkable.  No acute fracture.    Soft tissues:  Unremarkable.    Sinuses:  Unremarkable as visualized.   No acute sinusitis.    Mastoid air cells:  Unremarkable as visualized.  No mastoid effusion.       Impression:        Unremarkable exam demonstrating no CT evidence of acute intracranial  findings.     This report was finalized on 3/14/2023 3:50 PM by Dr. Magdaleno Matute MD.       XR Chest AP [[id number]] Collected: 03/14/23 1549     Updated: 03/14/23 1552    Narrative:      EXAM:    XR Chest, 1 View     EXAM DATE:    3/14/2023 3:30 PM     CLINICAL HISTORY:    chest pain     TECHNIQUE:    Frontal view of the chest.     COMPARISON:    03/09/2007     FINDINGS:    Lungs:  Chronic appearing lung changes.  No focal airspace disease  identified.    Pleural space:  Unremarkable.  No pneumothorax.    Heart:  Mild cardiomegaly.    Mediastinum:  Large hiatal hernia.    Bones/joints:  Unremarkable.    Tubes, lines and devices:  Left cardiac pacer device.       Impression:      1.  No acute cardiopulmonary findings.  2.  Large hiatal hernia.  3.  Mild cardiac enlargement.     This report was finalized on 3/14/2023 3:49 PM by Dr. Magdaleno Matute MD.           ----------------------------------------------------------------------------------------------------------------------  Assessment and Plan:  -Chest pain with typical and atypical . mild troponinemia no significant delta  -Episodes of bigeminy and bradycardia  -acute cystitis without hematuria  -Large hiatal hernia  -Apparent history of Monae's esophagus  -History of sick sinus syndrome status post [employer] pacemaker  -History of nonobstructive septal hypertrophy  -History of dyslipidemia  -Movement disorder?  Parkinson's disease    So far on telemetry no recurrence of bigeminy, troponin is negative, cardiology is evaluating.  Awaiting for recommendation.  Activity as tolerated.  Follow-up cultures.  2D echo.    Disposition pending      Clara Woodard MD  03/15/23  12:28 EDT

## 2023-03-15 NOTE — PLAN OF CARE
Goal Outcome Evaluation:  Patient is resting in bed watching television. Patient will be NPO at midnight for stress test tomorrow. Patient has tolerated all interventions. No complaints or concerns at this time. No signs of acute distress noted. Will continue to follow plan of care.

## 2023-03-16 ENCOUNTER — APPOINTMENT (OUTPATIENT)
Dept: CARDIOLOGY | Facility: HOSPITAL | Age: 78
End: 2023-03-16
Payer: MEDICARE

## 2023-03-16 ENCOUNTER — APPOINTMENT (OUTPATIENT)
Dept: NUCLEAR MEDICINE | Facility: HOSPITAL | Age: 78
End: 2023-03-16
Payer: MEDICARE

## 2023-03-16 LAB
BACTERIA SPEC AEROBE CULT: ABNORMAL
BACTERIA SPEC AEROBE CULT: ABNORMAL
BH CV NUCLEAR PRIOR STUDY: 3
BH CV REST NUCLEAR ISOTOPE DOSE: 9.5 MCI
BH CV STRESS BP STAGE 1: NORMAL
BH CV STRESS COMMENTS STAGE 1: NORMAL
BH CV STRESS DOSE REGADENOSON STAGE 1: 0.4
BH CV STRESS DURATION MIN STAGE 1: 0
BH CV STRESS DURATION SEC STAGE 1: 10
BH CV STRESS HR STAGE 1: 86
BH CV STRESS NUCLEAR ISOTOPE DOSE: 29.5 MCI
BH CV STRESS PROTOCOL 1: NORMAL
BH CV STRESS RECOVERY BP: NORMAL MMHG
BH CV STRESS RECOVERY HR: 77 BPM
BH CV STRESS STAGE 1: 1
GRAM STN SPEC: ABNORMAL
ISOLATED FROM: ABNORMAL
LV EF NUC BP: 51 %
MAXIMAL PREDICTED HEART RATE: 144 BPM
PERCENT MAX PREDICTED HR: 59.72 %
QT INTERVAL: 414 MS
QTC INTERVAL: 471 MS
STRESS BASELINE BP: NORMAL MMHG
STRESS BASELINE HR: 65 BPM
STRESS PERCENT HR: 70 %
STRESS POST PEAK BP: NORMAL MMHG
STRESS POST PEAK HR: 86 BPM
STRESS TARGET HR: 122 BPM

## 2023-03-16 PROCEDURE — G0378 HOSPITAL OBSERVATION PER HR: HCPCS

## 2023-03-16 PROCEDURE — 96372 THER/PROPH/DIAG INJ SC/IM: CPT

## 2023-03-16 PROCEDURE — 78452 HT MUSCLE IMAGE SPECT MULT: CPT | Performed by: INTERNAL MEDICINE

## 2023-03-16 PROCEDURE — 93018 CV STRESS TEST I&R ONLY: CPT | Performed by: INTERNAL MEDICINE

## 2023-03-16 PROCEDURE — 96366 THER/PROPH/DIAG IV INF ADDON: CPT

## 2023-03-16 PROCEDURE — 99231 SBSQ HOSP IP/OBS SF/LOW 25: CPT | Performed by: INTERNAL MEDICINE

## 2023-03-16 PROCEDURE — 87040 BLOOD CULTURE FOR BACTERIA: CPT | Performed by: HOSPITALIST

## 2023-03-16 PROCEDURE — 25010000002 CEFTRIAXONE PER 250 MG: Performed by: STUDENT IN AN ORGANIZED HEALTH CARE EDUCATION/TRAINING PROGRAM

## 2023-03-16 PROCEDURE — 25010000002 REGADENOSON 0.4 MG/5ML SOLUTION: Performed by: HOSPITALIST

## 2023-03-16 PROCEDURE — 0 TECHNETIUM SESTAMIBI: Performed by: HOSPITALIST

## 2023-03-16 PROCEDURE — 99232 SBSQ HOSP IP/OBS MODERATE 35: CPT | Performed by: HOSPITALIST

## 2023-03-16 PROCEDURE — A9500 TC99M SESTAMIBI: HCPCS | Performed by: HOSPITALIST

## 2023-03-16 PROCEDURE — 93017 CV STRESS TEST TRACING ONLY: CPT

## 2023-03-16 PROCEDURE — 78452 HT MUSCLE IMAGE SPECT MULT: CPT

## 2023-03-16 PROCEDURE — 63710000001 ONDANSETRON ODT 4 MG TABLET DISPERSIBLE: Performed by: STUDENT IN AN ORGANIZED HEALTH CARE EDUCATION/TRAINING PROGRAM

## 2023-03-16 PROCEDURE — 25010000002 ENOXAPARIN PER 10 MG: Performed by: STUDENT IN AN ORGANIZED HEALTH CARE EDUCATION/TRAINING PROGRAM

## 2023-03-16 RX ADMIN — PANTOPRAZOLE SODIUM 40 MG: 40 TABLET, DELAYED RELEASE ORAL at 17:36

## 2023-03-16 RX ADMIN — ATORVASTATIN CALCIUM 20 MG: 20 TABLET, FILM COATED ORAL at 08:11

## 2023-03-16 RX ADMIN — Medication 5 MG: at 20:33

## 2023-03-16 RX ADMIN — ENOXAPARIN SODIUM 40 MG: 40 INJECTION SUBCUTANEOUS at 08:12

## 2023-03-16 RX ADMIN — CARBIDOPA AND LEVODOPA 1 TABLET: 25; 250 TABLET ORAL at 17:36

## 2023-03-16 RX ADMIN — AMLODIPINE BESYLATE 5 MG: 5 TABLET ORAL at 17:36

## 2023-03-16 RX ADMIN — CEFTRIAXONE 1 G: 1 INJECTION, POWDER, FOR SOLUTION INTRAMUSCULAR; INTRAVENOUS at 17:36

## 2023-03-16 RX ADMIN — CARBIDOPA AND LEVODOPA 1 TABLET: 25; 250 TABLET ORAL at 12:22

## 2023-03-16 RX ADMIN — Medication 10 ML: at 08:13

## 2023-03-16 RX ADMIN — CARBIDOPA AND LEVODOPA 1 TABLET: 25; 250 TABLET ORAL at 08:11

## 2023-03-16 RX ADMIN — ACETAMINOPHEN 500 MG: 500 TABLET ORAL at 20:33

## 2023-03-16 RX ADMIN — TECHNETIUM TC 99M SESTAMIBI 1 DOSE: 1 INJECTION INTRAVENOUS at 14:12

## 2023-03-16 RX ADMIN — Medication 10 ML: at 20:28

## 2023-03-16 RX ADMIN — SERTRALINE 100 MG: 50 TABLET, FILM COATED ORAL at 08:11

## 2023-03-16 RX ADMIN — ONDANSETRON 4 MG: 4 TABLET, ORALLY DISINTEGRATING ORAL at 08:49

## 2023-03-16 RX ADMIN — PRAMIPEXOLE DIHYDROCHLORIDE 0.5 MG: 0.12 TABLET ORAL at 20:28

## 2023-03-16 RX ADMIN — TECHNETIUM TC 99M SESTAMIBI 1 DOSE: 1 INJECTION INTRAVENOUS at 12:50

## 2023-03-16 RX ADMIN — PANTOPRAZOLE SODIUM 40 MG: 40 TABLET, DELAYED RELEASE ORAL at 08:11

## 2023-03-16 RX ADMIN — ASPIRIN 81 MG: 81 TABLET, CHEWABLE ORAL at 08:11

## 2023-03-16 RX ADMIN — CARBIDOPA AND LEVODOPA 1 TABLET: 25; 250 TABLET ORAL at 20:28

## 2023-03-16 RX ADMIN — METOPROLOL TARTRATE 25 MG: 25 TABLET, FILM COATED ORAL at 20:28

## 2023-03-16 RX ADMIN — REGADENOSON 0.4 MG: 0.08 INJECTION, SOLUTION INTRAVENOUS at 14:12

## 2023-03-16 RX ADMIN — AMITRIPTYLINE HYDROCHLORIDE 25 MG: 25 TABLET, FILM COATED ORAL at 20:27

## 2023-03-16 NOTE — PROGRESS NOTES
Patient information:   LOS: 0 days     Name: Layne Rodriguez  Age/Sex: 77 y.o. female  :  1945        PCP: Kreis, Samuel Duane, MD  Attending: No ref. provider found  MRN:  [id number]  Visit Number:  [id number]    Principal Problem:    Chest pain, unspecified type      Reason for follow-up: Frequent symptomatic PVCs with near syncopal episode.    Subjective   ADMISSION INFORMATION:  HPI:  Subjective   Layne Rodriguez is a 77 y.o. female with a past medical history significant for sick tachy-bradycardia syndrome status post [employer] pacemaker implantation with generator change in 2019, ASCVD status post 3 stents (in  per Dr. Jin), essential hypertension, dyslipidemia, anemia, arthritis, GERD with Monae's esophagus, COPD, history of Parkinson's with degenerative changes of the brain per patient, and osteoporosis. Patient presented to [facility] emergency room (ER) on 3/14/2023 with complaints of right-sided headache and chest pain.  Patient reported her headache started in the evening 2023 and that she had noted some intermittent drawing up in her right hand.  She reported that her chest pain was earlier in the day on 2023 and described it as mild in the substernal area but denied pain upon presentation to ER.  Patient denied any alleviating or aggravating factors.  Patient denied any treatment measures.  Creatinine 0.73 and potassium was 4.0.  EKG revealed sinus rhythm with 75 bpm with multiple PVCs but no significant ST deviation or T wave abnormalities concerning for acute ischemia were noted.  Initial high sensitive troponin T was 18 and repeat was 17 with a delta of -1.  Single view chest x-ray showed no acute cardiopulmonary findings other than mild cardiac enlargement.  CT of the head was unremarkable for any evidence of acute intracranial findings.  Patient was admitted and Cardiology has been consulted for further evaluation and management.  Patient's  local cardiology is with [facility] but it appears her follow-ups have been lost and her last visit in the office was 03/10/2021.     Interval History:   Telemetry shows alternating paced/ SR 60's.  No adverse events noted overnight.  CRT is 0.63 and stable.  HGB is 10.5 which is a slight bump down from admission at 11.4.  B/P is 124/60 as last recorded.  Patient is lying in bed resting quietly. The head of bed is elevated to about 15 degrees. No acute distress is noted at this time.  She denies chest pain, dizziness, shortness of breath or palpitations.  She has been n.p.o. since midnight for stress test later today.  Patient does report heartburn today.    Vital Signs  Temp:  [97.9 °F (36.6 °C)-98.5 °F (36.9 °C)] 98 °F (36.7 °C)  Heart Rate:  [65-73] 66  Resp:  [18] 18  BP: (119-147)/(60-68) 124/60  Vital Signs (last 72 hrs)       03/13 0700  03/14 0659 03/14 0700  03/15 0659 03/15 0700  03/16 0659 03/16 0700  03/16 0735   Most Recent      Temp (°F)   97.3 -  98.8    97.9 -  98.5       98.3 (36.8) 03/16 0651    Heart Rate   60 -  84    60 -  73       65 03/16 0651    Resp   17 -  18      18       18 03/16 0651    BP   122/64 -  151/78    119/60 -  147/65       119/60 03/16 0651    SpO2 (%)   93 -  97    94 -  96       96 03/16 0651        Body mass index is 26.48 kg/m².    Intake/Output Summary (Last 24 hours) at 3/16/2023 1147  Last data filed at 3/15/2023 1800  Gross per 24 hour   Intake 240 ml   Output --   Net 240 ml     Objective  Objective     Physical Exam:      General Appearance:    Alert, cooperative, in no acute distress.   Head:    Normocephalic, without obvious abnormality, atraumatic.   Eyes:                          Conjunctivae and sclerae normal, no icterus, no pallor, corneas clear.   Neck:   No adenopathy, supple, trachea midline, no thyromegaly, no carotid bruit noted, no JVD.   Lungs:     Clear to auscultation, respirations regular, even and             unlabored.    Heart:    Regular  rhythm and normal rate, normal S1 and S2, systolic + murmur noted grade 3/6 heard best at the LLSB, no gallop, no rub, no click   Chest Wall:    No abnormalities observed.   Abdomen:     Normal bowel sounds, no masses, no organomegaly, soft      non-tender, non-distended, no guarding, no rebound                tenderness.   Extremities:   Moves all extremities well, no edema, no cyanosis, no            Redness.  Left hand  push and pull is weaker than in the right.   Pulses:   Pulses palpable and equal bilaterally.   Skin:   No bleeding, bruising or rash.   Neurologic:   Alert and Oriented x 3, Speech Clear & comprehensive.       Results review   Results Review:   Results from last 7 days   Lab Units 03/15/23  0510 03/14/23  1519   WBC 10*3/mm3 8.06 9.35   HEMOGLOBIN g/dL 10.5* 11.4*   PLATELETS 10*3/mm3 254 283     Results from last 7 days   Lab Units 03/15/23  0510 03/14/23  1519   SODIUM mmol/L 139 140   POTASSIUM mmol/L 3.8 4.0   CHLORIDE mmol/L 105 103   CO2 mmol/L 26.6 26.0   BUN mg/dL 23 24*   CREATININE mg/dL 0.63 0.73   CALCIUM mg/dL 9.2 9.6   GLUCOSE mg/dL 101* 109*   ALT (SGPT) U/L  --  <5   AST (SGOT) U/L  --  15     Results from last 7 days   Lab Units 03/14/23  1719 03/14/23  1519   HSTROP T ng/L 17* 18*     No results found for: PROBNP     Lab Results   Component Value Date    INR 1.01 03/14/2023     Lab Results   Component Value Date    MG 2.0 11/20/2019     Lab Results   Component Value Date    TSH 0.999 03/14/2023      Pain Management Panel    There is no flowsheet data to display.       Imaging Results (Last 48 Hours)     Procedure Component Value Units Date/Time    CT Head Without Contrast [[id number]] Collected: 03/14/23 1549     Updated: 03/14/23 1552    Narrative:      EXAM:    CT Head Without Intravenous Contrast     EXAM DATE:    3/14/2023 3:30 PM     CLINICAL HISTORY:    headache     TECHNIQUE:    Axial computed tomography images of the head/brain without intravenous  contrast.  Sagittal  and coronal reformatted images were created and  reviewed.  This CT exam was performed using one or more of the following  dose reduction techniques:  automated exposure control, adjustment of  the mA and/or kV according to patient size, and/or use of iterative  reconstruction technique.     COMPARISON:    No relevant prior studies available.     FINDINGS:    Brain:  Unremarkable.  No hemorrhage.  No significant white matter  disease.  No edema.    Ventricles:  Unremarkable.  No ventriculomegaly.    Bones/joints:  Unremarkable.  No acute fracture.    Soft tissues:  Unremarkable.    Sinuses:  Unremarkable as visualized.  No acute sinusitis.    Mastoid air cells:  Unremarkable as visualized.  No mastoid effusion.       Impression:        Unremarkable exam demonstrating no CT evidence of acute intracranial  findings.     This report was finalized on 3/14/2023 3:50 PM by Dr. Magdaleno Matute MD.       XR Chest AP [[id number]] Collected: 03/14/23 1549     Updated: 03/14/23 1552    Narrative:      EXAM:    XR Chest, 1 View     EXAM DATE:    3/14/2023 3:30 PM     CLINICAL HISTORY:    chest pain     TECHNIQUE:    Frontal view of the chest.     COMPARISON:    03/09/2007     FINDINGS:    Lungs:  Chronic appearing lung changes.  No focal airspace disease  identified.    Pleural space:  Unremarkable.  No pneumothorax.    Heart:  Mild cardiomegaly.    Mediastinum:  Large hiatal hernia.    Bones/joints:  Unremarkable.    Tubes, lines and devices:  Left cardiac pacer device.       Impression:      1.  No acute cardiopulmonary findings.  2.  Large hiatal hernia.  3.  Mild cardiac enlargement.     This report was finalized on 3/14/2023 3:49 PM by Dr. Magdaleno Matute MD.           ECHO:  Results for orders placed during the hospital encounter of 03/14/23    Adult Transthoracic Echo Complete W/ Cont if Necessary Per Protocol    Interpretation Summary  •  Left ventricular systolic function is hyperdynamic (EF > 70%). Left ventricular  ejection fraction appears to be greater than 70%, with almost closure of the left ventricular cavity know that the ventricular outflow tract gradient was done during the study, the study is also consistent with hypertrophic cardiomyopathy.  •  Left ventricular wall thickness is consistent with severe concentric hypertrophy.  •  Left ventricular diastolic function is consistent with (grade Ia w/high LAP) impaired relaxation.  •  Estimated right ventricular systolic pressure from tricuspid regurgitation is normal (<35 mmHg).  •  Pacemaker leads was seen in the right atrium and right ventricle.      STRESS TEST:  Scheduled for today.     HEART CATH:  No results found for this or any previous visit.      Telemetry: Paced/ SR 60's           I reviewed the patient's new clinical results.    Medication Review:   amitriptyline, 25 mg, Oral, Nightly  amLODIPine, 5 mg, Oral, Daily  aspirin, 81 mg, Oral, Daily  atorvastatin, 20 mg, Oral, Daily  carbidopa-levodopa, 1 tablet, Oral, 4x Daily  cefTRIAXone, 1 g, Intravenous, Q24H  enoxaparin, 40 mg, Subcutaneous, Daily  metoprolol tartrate, 25 mg, Oral, Q12H  pantoprazole, 40 mg, Oral, BID AC  pramipexole, 0.5 mg, Oral, Nightly  senna-docusate sodium, 2 tablet, Oral, Nightly  sertraline, 100 mg, Oral, Daily  sodium chloride, 10 mL, Intravenous, Q12H         Assessment      1. Chest pains with some typical and some atypical features for CCS class III-IV angina.  2. ASCVD, status post previous PCI/stenting at [facility] (details unavailable).  3. Status post permanent dual-chamber pacemaker implantation by  with generator change in 2019 with [employer] medical device.  4. Intermittent palpitations with dizziness but no syncope.  5. Mildly elevated troponin of 18 with a flat trend which is of questionable significance.  6. History of parkinsonism with limited functional capacity.    Plan     1. Evaluate her chest pains with Lexiscan study today.  2. Get her  pacemaker interrogated.    I discussed the patients findings and my recommendations with patient.    Electronically signed by JOSUÉ Webber,  Electronically signed by Flako Pavon MD, 03/16/23, 9:10 PM EDT.     03/16/23, 11:48 AM EDT.      Please note that portions of this note were completed with a voice recognition program.    Please note that portions of this note were copied and has been reviewed and is accurate as of date.

## 2023-03-16 NOTE — PLAN OF CARE
Goal Outcome Evaluation:  Plan of Care Reviewed With: patient           Outcome Evaluation: Patient is resting in bed with no complaints at this time. Earlier in the shift patient was having heart burn. It was not relieved with tums, but was from a GI cocktail. Other than that, patient has not had any other pain. Will continue to monitor and follow plan of care.

## 2023-03-16 NOTE — PROGRESS NOTES
[facility]     Patient Identification:  Name:  Layne Rodriguez  Age:  77 y.o.  Sex:  female  :  1945  MRN:  [id number]  Visit Number:  [id number]  Primary Care Provider:  Kreis, Samuel Duane, MD    Length of stay:  0    Subjective: Patient seen and examined, she is on her way for stress test, reported very brief chest discomfort while in bed lasting for few seconds.  Review of telemetry no arrhythmia predominantly sinus rhythm 60+ beats per minute rare episodes of paced rhythm    Chief Complaint: Chest pain  ----------------------------------------------------------------------------------------------------------------------  Current Hospital Meds:  amitriptyline, 25 mg, Oral, Nightly  amLODIPine, 5 mg, Oral, Daily  aspirin, 81 mg, Oral, Daily  atorvastatin, 20 mg, Oral, Daily  carbidopa-levodopa, 1 tablet, Oral, 4x Daily  cefTRIAXone, 1 g, Intravenous, Q24H  enoxaparin, 40 mg, Subcutaneous, Daily  metoprolol tartrate, 25 mg, Oral, Q12H  pantoprazole, 40 mg, Oral, BID AC  pramipexole, 0.5 mg, Oral, Nightly  senna-docusate sodium, 2 tablet, Oral, Nightly  sertraline, 100 mg, Oral, Daily  sodium chloride, 10 mL, Intravenous, Q12H         ----------------------------------------------------------------------------------------------------------------------  Vital Signs:  Temp:  [97.9 °F (36.6 °C)-98.5 °F (36.9 °C)] 98 °F (36.7 °C)  Heart Rate:  [65-73] 66  Resp:  [18] 18  BP: (119-147)/(60-68) 124/60       Tele: Sinus rhythm 65 bpm      03/14/23  2037 03/15/23  0545 23  0500   Weight: 65.8 kg (145 lb 1.6 oz) 65.8 kg (145 lb 1.6 oz) (admit weight less than 24 hours) 65.7 kg (144 lb 12.8 oz)     Body mass index is 26.48 kg/m².    Intake/Output Summary (Last 24 hours) at 3/16/2023 1446  Last data filed at 3/15/2023 1800  Gross per 24 hour   Intake 240 ml   Output --   Net 240 ml     NPO Diet NPO Type: Sips with  Meds  ----------------------------------------------------------------------------------------------------------------------  Physical exam:  General: Comfortable,awake, alert, oriented to self, place, and time, well-developed and well-nourished.  No respiratory distress.    Skin:  Skin is warm and dry. No rash noted. No pallor.    HENT:  Head:  Normocephalic and atraumatic.  Mouth:  Moist mucous membranes.    Eyes:  Conjunctivae and EOM are normal.  Pupils are equal, round, and reactive to light.  No scleral icterus.    Neck:  Neck supple.  No JVD present.    No bruit  Pulmonary/Chest: Paced maker left side no respiratory distress, no wheezes, no crackles, with normal breath sounds and good air movement.  Cardiovascular:  Normal rate, regular rhythm and normal heart sounds with no murmur.  Abdominal:  Soft.  Bowel sounds are normal.  No distension and no tenderness.   Extremities:  No edema, no tenderness, and no deformity.  No red or swollen joints anywhere.  Strong pulses in all 4 extremities with no clubbing, no cyanosis, no edema.  Neurological:  Motor strength equal no obvious deficit, sensory grossly intact.   No cranial nerve deficit.  No tongue deviation.  No facial droop.  No slurred speech.    Genitourinary: No Sibley catheter, she has external urinary catheter  Back:  ----------------------------------------------------------------------------------------------------------------------  ----------------------------------------------------------------------------------------------------------------------  Results from last 7 days   Lab Units 03/14/23  1719 03/14/23  1519   HSTROP T ng/L 17* 18*     Results from last 7 days   Lab Units 03/15/23  0510 03/14/23  1556 03/14/23  1519   CRP mg/dL  --   --  <0.30   LACTATE mmol/L  --  0.9  --    WBC 10*3/mm3 8.06  --  9.35   HEMOGLOBIN g/dL 10.5*  --  11.4*   HEMATOCRIT % 35.5  --  37.2   MCV fL 79.1  --  79.3   MCHC g/dL 29.6*  --  30.6*   PLATELETS 10*3/mm3 254   --  283   INR   --   --  1.01         Results from last 7 days   Lab Units 03/15/23  0510 03/14/23  1519   SODIUM mmol/L 139 140   POTASSIUM mmol/L 3.8 4.0   CHLORIDE mmol/L 105 103   CO2 mmol/L 26.6 26.0   BUN mg/dL 23 24*   CREATININE mg/dL 0.63 0.73   CALCIUM mg/dL 9.2 9.6   GLUCOSE mg/dL 101* 109*   ALBUMIN g/dL  --  3.8   BILIRUBIN mg/dL  --  <0.2   ALK PHOS U/L  --  67   AST (SGOT) U/L  --  15   ALT (SGPT) U/L  --  <5   Estimated Creatinine Clearance: 67.5 mL/min (by C-G formula based on SCr of 0.63 mg/dL).    No results found for: AMMONIA      Blood Culture   Date Value Ref Range Status   03/14/2023 Staphylococcus, coagulase negative (C)  Final   03/14/2023 No growth at 24 hours  Preliminary     Urine Culture   Date Value Ref Range Status   03/14/2023 >100,000 CFU/mL Escherichia coli (A)  Final     No results found for: WOUNDCX  No results found for: STOOLCX    I have personally looked at the labs and they are summarized above.  ----------------------------------------------------------------------------------------------------------------------  Imaging Results (Last 24 Hours)     ** No results found for the last 24 hours. **        ----------------------------------------------------------------------------------------------------------------------  Assessment and Plan:  -Chest pain with typical and atypical features  -Large hiatal hernia  -History of movement disorder Parkinson disease  -History of dyslipidemia  -Prior history of Monae's esophagus  -History of sick sinus syndrome status post pacemaker placement  -Acute cystitis without hematuria E. coli on culture  -Bacteremia Staphylococcus coagulase-negative likely contaminant, repeat blood culture ordered    Patient is getting stress test, cardiology recommended pacemaker interrogation help appreciated.    Disposition Home in stable      Clara Celio Woodard MD  03/16/23  14:46 EDT

## 2023-03-16 NOTE — PLAN OF CARE
Goal Outcome Evaluation:  Patient is resting in bed watching television. Patient had a stress test today. Patient has tolerated all interventions. No complaints or concerns at this time. No signs of acute distress noted. Will continue to follow plan of care.

## 2023-03-17 LAB — MAGNESIUM SERPL-MCNC: 2 MG/DL (ref 1.6–2.4)

## 2023-03-17 PROCEDURE — 25010000002 CEFTRIAXONE PER 250 MG: Performed by: STUDENT IN AN ORGANIZED HEALTH CARE EDUCATION/TRAINING PROGRAM

## 2023-03-17 PROCEDURE — 25010000002 ENOXAPARIN PER 10 MG: Performed by: STUDENT IN AN ORGANIZED HEALTH CARE EDUCATION/TRAINING PROGRAM

## 2023-03-17 PROCEDURE — G0378 HOSPITAL OBSERVATION PER HR: HCPCS

## 2023-03-17 PROCEDURE — 96372 THER/PROPH/DIAG INJ SC/IM: CPT

## 2023-03-17 PROCEDURE — 99232 SBSQ HOSP IP/OBS MODERATE 35: CPT | Performed by: INTERNAL MEDICINE

## 2023-03-17 PROCEDURE — 99232 SBSQ HOSP IP/OBS MODERATE 35: CPT | Performed by: HOSPITALIST

## 2023-03-17 PROCEDURE — 96366 THER/PROPH/DIAG IV INF ADDON: CPT

## 2023-03-17 PROCEDURE — 83735 ASSAY OF MAGNESIUM: CPT | Performed by: INTERNAL MEDICINE

## 2023-03-17 PROCEDURE — 63710000001 ONDANSETRON ODT 4 MG TABLET DISPERSIBLE: Performed by: STUDENT IN AN ORGANIZED HEALTH CARE EDUCATION/TRAINING PROGRAM

## 2023-03-17 RX ORDER — POTASSIUM CHLORIDE 1.5 G/1.77G
40 POWDER, FOR SOLUTION ORAL AS NEEDED
Status: DISCONTINUED | OUTPATIENT
Start: 2023-03-17 | End: 2023-03-18 | Stop reason: HOSPADM

## 2023-03-17 RX ORDER — MAGNESIUM SULFATE HEPTAHYDRATE 40 MG/ML
2 INJECTION, SOLUTION INTRAVENOUS AS NEEDED
Status: DISCONTINUED | OUTPATIENT
Start: 2023-03-17 | End: 2023-03-18 | Stop reason: HOSPADM

## 2023-03-17 RX ORDER — MAGNESIUM SULFATE HEPTAHYDRATE 40 MG/ML
4 INJECTION, SOLUTION INTRAVENOUS AS NEEDED
Status: DISCONTINUED | OUTPATIENT
Start: 2023-03-17 | End: 2023-03-18 | Stop reason: HOSPADM

## 2023-03-17 RX ORDER — LOPERAMIDE HYDROCHLORIDE 2 MG/1
4 CAPSULE ORAL ONCE
Status: COMPLETED | OUTPATIENT
Start: 2023-03-18 | End: 2023-03-18

## 2023-03-17 RX ORDER — METOPROLOL TARTRATE 50 MG/1
50 TABLET, FILM COATED ORAL EVERY 12 HOURS SCHEDULED
Status: DISCONTINUED | OUTPATIENT
Start: 2023-03-17 | End: 2023-03-18 | Stop reason: HOSPADM

## 2023-03-17 RX ORDER — POTASSIUM CHLORIDE 750 MG/1
40 CAPSULE, EXTENDED RELEASE ORAL AS NEEDED
Status: DISCONTINUED | OUTPATIENT
Start: 2023-03-17 | End: 2023-03-18 | Stop reason: HOSPADM

## 2023-03-17 RX ADMIN — ENOXAPARIN SODIUM 40 MG: 40 INJECTION SUBCUTANEOUS at 08:40

## 2023-03-17 RX ADMIN — Medication 5 MG: at 20:57

## 2023-03-17 RX ADMIN — APIXABAN 5 MG: 5 TABLET, FILM COATED ORAL at 17:25

## 2023-03-17 RX ADMIN — PANTOPRAZOLE SODIUM 40 MG: 40 TABLET, DELAYED RELEASE ORAL at 08:40

## 2023-03-17 RX ADMIN — ACETAMINOPHEN 500 MG: 500 TABLET ORAL at 23:24

## 2023-03-17 RX ADMIN — CARBIDOPA AND LEVODOPA 1 TABLET: 25; 250 TABLET ORAL at 20:49

## 2023-03-17 RX ADMIN — CARBIDOPA AND LEVODOPA 1 TABLET: 25; 250 TABLET ORAL at 17:25

## 2023-03-17 RX ADMIN — Medication 10 ML: at 20:50

## 2023-03-17 RX ADMIN — CEFTRIAXONE 1 G: 1 INJECTION, POWDER, FOR SOLUTION INTRAMUSCULAR; INTRAVENOUS at 17:25

## 2023-03-17 RX ADMIN — SERTRALINE 100 MG: 50 TABLET, FILM COATED ORAL at 08:39

## 2023-03-17 RX ADMIN — AMLODIPINE BESYLATE 5 MG: 5 TABLET ORAL at 08:40

## 2023-03-17 RX ADMIN — ATORVASTATIN CALCIUM 20 MG: 20 TABLET, FILM COATED ORAL at 08:40

## 2023-03-17 RX ADMIN — METOPROLOL TARTRATE 50 MG: 50 TABLET, FILM COATED ORAL at 20:54

## 2023-03-17 RX ADMIN — AMITRIPTYLINE HYDROCHLORIDE 25 MG: 25 TABLET, FILM COATED ORAL at 20:49

## 2023-03-17 RX ADMIN — ASPIRIN 81 MG: 81 TABLET, CHEWABLE ORAL at 08:40

## 2023-03-17 RX ADMIN — PRAMIPEXOLE DIHYDROCHLORIDE 0.5 MG: 0.12 TABLET ORAL at 20:49

## 2023-03-17 RX ADMIN — CARBIDOPA AND LEVODOPA 1 TABLET: 25; 250 TABLET ORAL at 08:46

## 2023-03-17 RX ADMIN — ONDANSETRON 4 MG: 4 TABLET, ORALLY DISINTEGRATING ORAL at 23:23

## 2023-03-17 RX ADMIN — Medication 10 ML: at 08:41

## 2023-03-17 RX ADMIN — METOPROLOL TARTRATE 25 MG: 25 TABLET, FILM COATED ORAL at 08:40

## 2023-03-17 RX ADMIN — PANTOPRAZOLE SODIUM 40 MG: 40 TABLET, DELAYED RELEASE ORAL at 17:25

## 2023-03-17 RX ADMIN — CARBIDOPA AND LEVODOPA 1 TABLET: 25; 250 TABLET ORAL at 11:31

## 2023-03-17 NOTE — PROGRESS NOTES
[facility]     Patient Identification:  Name:  Layne Rodriguez  Age:  77 y.o.  Sex:  female  :  1945  MRN:  [id number]  Visit Number:  [id number]  Primary Care Provider:  Kreis, Samuel Duane, MD    Length of stay:  0    Subjective: Patient denies recurrence of chest pain no palpitation, vital signs stable no arrhythmia on telemetry, stress test low risk study small infarct lateral wall with no reversible ischemia.  Awaiting for pacemaker interrogation.  Cardiology help appreciated     Chief Complaint: Chest pain  ----------------------------------------------------------------------------------------------------------------------  Current Hospital Meds:  amitriptyline, 25 mg, Oral, Nightly  aspirin, 81 mg, Oral, Daily  atorvastatin, 20 mg, Oral, Daily  carbidopa-levodopa, 1 tablet, Oral, 4x Daily  cefTRIAXone, 1 g, Intravenous, Q24H  enoxaparin, 40 mg, Subcutaneous, Daily  metoprolol tartrate, 50 mg, Oral, Q12H  pantoprazole, 40 mg, Oral, BID AC  pramipexole, 0.5 mg, Oral, Nightly  senna-docusate sodium, 2 tablet, Oral, Nightly  sertraline, 100 mg, Oral, Daily  sodium chloride, 10 mL, Intravenous, Q12H         ----------------------------------------------------------------------------------------------------------------------  Vital Signs:  Temp:  [96.8 °F (36 °C)-98.1 °F (36.7 °C)] 96.8 °F (36 °C)  Heart Rate:  [65-80] 65  Resp:  [18] 18  BP: (113-142)/(57-72) 121/71       Tele: Paced rhythm 69 bpm      03/15/23  0545 23  0500 23  0500   Weight: 65.8 kg (145 lb 1.6 oz) (admit weight less than 24 hours) 65.7 kg (144 lb 12.8 oz) 65.3 kg (143 lb 14.4 oz)     Body mass index is 26.32 kg/m².    Intake/Output Summary (Last 24 hours) at 3/17/2023 1502  Last data filed at 3/16/2023 1800  Gross per 24 hour   Intake 120 ml   Output --   Net 120 ml     Diet: Regular/House Diet; Texture: Regular Texture (IDDSI 7); Fluid Consistency: Thin (IDDSI  0)  ----------------------------------------------------------------------------------------------------------------------  Physical exam:  General: Comfortable,awake, alert, oriented to self, place, and time, well-developed and well-nourished.  No respiratory distress.    Skin:  Skin is warm and dry. No rash noted. No pallor.    HENT:  Head:  Normocephalic and atraumatic.  Mouth:  Moist mucous membranes.    Eyes:  Conjunctivae and EOM are normal.  Pupils are equal, round, and reactive to light.  No scleral icterus.    Neck:  Neck supple.  No JVD present.    Pulmonary/Chest: Icemaker no respiratory distress, no wheezes, no crackles, with normal breath sounds and good air movement.  Cardiovascular:  Normal rate, regular rhythm and normal heart sounds with no murmur.  Abdominal:  Soft.  Bowel sounds are normal.  No distension and no tenderness.   Extremities:  No edema, no tenderness, and no deformity.  No red or swollen joints anywhere.  Strong pulses in all 4 extremities with no clubbing, no cyanosis, no edema.  Neurological:  Motor strength equal no obvious deficit, sensory grossly intact.   No cranial nerve deficit.  No tongue deviation.  No facial droop.  No slurred speech.    Genitourinary: No Sibley catheter  Back:  ----------------------------------------------------------------------------------------------------------------------  ----------------------------------------------------------------------------------------------------------------------  Results from last 7 days   Lab Units 03/14/23  1719 03/14/23  1519   HSTROP T ng/L 17* 18*     Results from last 7 days   Lab Units 03/15/23  0510 03/14/23  1556 03/14/23  1519   CRP mg/dL  --   --  <0.30   LACTATE mmol/L  --  0.9  --    WBC 10*3/mm3 8.06  --  9.35   HEMOGLOBIN g/dL 10.5*  --  11.4*   HEMATOCRIT % 35.5  --  37.2   MCV fL 79.1  --  79.3   MCHC g/dL 29.6*  --  30.6*   PLATELETS 10*3/mm3 254  --  283   INR   --   --  1.01         Results from last 7  days   Lab Units 03/15/23  0510 03/14/23  1519   SODIUM mmol/L 139 140   POTASSIUM mmol/L 3.8 4.0   CHLORIDE mmol/L 105 103   CO2 mmol/L 26.6 26.0   BUN mg/dL 23 24*   CREATININE mg/dL 0.63 0.73   CALCIUM mg/dL 9.2 9.6   GLUCOSE mg/dL 101* 109*   ALBUMIN g/dL  --  3.8   BILIRUBIN mg/dL  --  <0.2   ALK PHOS U/L  --  67   AST (SGOT) U/L  --  15   ALT (SGPT) U/L  --  <5   Estimated Creatinine Clearance: 67.4 mL/min (by C-G formula based on SCr of 0.63 mg/dL).    No results found for: AMMONIA      Blood Culture   Date Value Ref Range Status   03/16/2023 No growth at 24 hours  Preliminary   03/14/2023 Staphylococcus, coagulase negative (C)  Final   03/14/2023 No growth at 2 days  Preliminary     Urine Culture   Date Value Ref Range Status   03/14/2023 >100,000 CFU/mL Escherichia coli (A)  Final     No results found for: WOUNDCX  No results found for: STOOLCX    I have personally looked at the labs and they are summarized above.  ----------------------------------------------------------------------------------------------------------------------  Imaging Results (Last 24 Hours)     ** No results found for the last 24 hours. **        ----------------------------------------------------------------------------------------------------------------------  Assessment and Plan:  -Chest pain with atypical and typical features, troponin positive delta negative remained flat  -Large hiatal hernia  -History of sick sinus syndrome status post pacemaker placed  -Bacteremia suspected contamination repeat cultures done  -Acute cystitis without hematuria E. coli on culture  -Apparent history of Monae's esophagus  -Movement disorder/Parkinson's disease    Awaiting for interrogation of pacemaker, patient currently no recurrence of chest discomfort, ambulate as tolerated we will monitor closely hopefully home soon    Disposition Home when stable      Clara Woodard MD  03/17/23  15:02 EDT

## 2023-03-17 NOTE — PROGRESS NOTES
Patient information:   LOS: 0 days     Name: Layne Rodriguez  Age/Sex: 77 y.o. female  :  1945        PCP: Kreis, Samuel Duane, MD  Attending: No ref. provider found  MRN:  [id number]  Visit Number:  [id number]    Principal Problem:    Chest pain, unspecified type      Reason for follow-up: Frequent symptomatic PVCs with near syncopal episode.    Subjective   ADMISSION INFORMATION:  HPI:  Subjective   Layne Rodriguez is a 77 y.o. female with a past medical history significant for sick tachy-bradycardia syndrome status post [employer] pacemaker implantation with generator change in 2019, ASCVD status post 3 stents (in  per Dr. Jin), essential hypertension, dyslipidemia, anemia, arthritis, GERD with Monae's esophagus, COPD, history of Parkinson's with degenerative changes of the brain per patient, and osteoporosis. Patient presented to [facility] ([facility] emergency room (ER) on 3/14/2023 with complaints of right-sided headache and chest pain.  Patient reported her headache started in the evening 2023 and that she had noted some intermittent drawing up in her right hand.  She reported that her chest pain was earlier in the day on 2023 and described it as mild in the substernal area but denied pain upon presentation to ER.  Patient denied any alleviating or aggravating factors.  Patient denied any treatment measures.  Creatinine 0.73 and potassium was 4.0.  EKG revealed sinus rhythm with 75 bpm with multiple PVCs but no significant ST deviation or T wave abnormalities concerning for acute ischemia were noted.  Initial high sensitive troponin T was 18 and repeat was 17 with a delta of -1.  Single view chest x-ray showed no acute cardiopulmonary findings other than mild cardiac enlargement.  CT of the head was unremarkable for any evidence of acute intracranial findings.  Patient was admitted and Cardiology has been consulted for further evaluation and  management.  Patient's local cardiology is with [facility] but it appears her follow-ups have been lost and her last visit in the office was 03/10/2021.     Interval History:   Pacemaker interrogation pending.  [employer] representative Kankaity Brandon is aware.  Telemetry shows paced rhythm 60's.  No adverse events noted overnight. CRT is 0.63 and stable. HGB is 10.5 which is a slight drop from 11.4.  B/P is 142/64 as last recorded.  Patient is lying in bed resting quietly. The head of bed is elevated to about 30 degrees. No acute distress is noted at this time.  She denies chest pain, shortness of breath or palpitations.     PLAN: Stop Norvasc. Increase Lopressor to 50 mg p BID. Asked Pharmacy to price check DOACs.     Vital Signs  Temp:  [97.3 °F (36.3 °C)-98.1 °F (36.7 °C)] 98.1 °F (36.7 °C)  Heart Rate:  [65-80] 65  Resp:  [18] 18  BP: (113-142)/(57-72) 142/64  Vital Signs (last 72 hrs)       03/14 0700  03/15 0659 03/15 0700  03/16 0659 03/16 0700  03/17 0659 03/17 0700  03/17 0903   Most Recent      Temp (°F) 97.3 -  98.8    97.9 -  98.5    97.3 -  98.1       98.1 (36.7) 03/17 0651    Heart Rate 60 -  84    60 -  73    65 -  80       65 03/17 0651    Resp 17 -  18      18      18       18 03/17 0651    /64 -  151/78    119/60 -  147/65    113/57 -  142/64       142/64 03/17 0651    SpO2 (%) 93 -  97    94 -  96    94 -  96       94 03/17 0651        Body mass index is 26.32 kg/m².    Intake/Output Summary (Last 24 hours) at 3/17/2023 0903  Last data filed at 3/16/2023 1800  Gross per 24 hour   Intake 120 ml   Output --   Net 120 ml     Objective  Objective     I have seen and examined Ms. Rodriguez today  Physical Exam:      General Appearance:    Alert, cooperative, in no acute distress.   Head:    Normocephalic, without obvious abnormality, atraumatic.   Eyes:                          Conjunctivae and sclerae normal, no icterus, no pallor, corneas clear.   Neck:   No adenopathy, supple, trachea  midline, no thyromegaly, no carotid bruit, no JVD.   Lungs:     Clear to auscultation, respirations regular, even and             unlabored.    Heart:    Regular rhythm and normal rate, normal S1 and S2, + murmur noted grade 3 out of 6 heard best at the LLSB, no gallop, no rub, no click   Chest Wall:    No abnormalities observed.   Abdomen:     Normal bowel sounds, no masses, no organomegaly, soft nontender, nondistended, no guarding, no rebound tenderness.   Extremities:   Moves all extremities well, no edema, no cyanosis, no            Redness.  Left hand , push, and pull is weaker than in the right.   Pulses:   Pulses palpable and equal bilaterally.   Skin:   No bleeding, bruising or rash.   Neurologic:   Alert and Oriented x 3, Speech Clear & comprehensive.       Results review   Results Review:   Results from last 7 days   Lab Units 03/15/23  0510 03/14/23  1519   WBC 10*3/mm3 8.06 9.35   HEMOGLOBIN g/dL 10.5* 11.4*   PLATELETS 10*3/mm3 254 283     Results from last 7 days   Lab Units 03/15/23  0510 03/14/23  1519   SODIUM mmol/L 139 140   POTASSIUM mmol/L 3.8 4.0   CHLORIDE mmol/L 105 103   CO2 mmol/L 26.6 26.0   BUN mg/dL 23 24*   CREATININE mg/dL 0.63 0.73   CALCIUM mg/dL 9.2 9.6   GLUCOSE mg/dL 101* 109*   ALT (SGPT) U/L  --  <5   AST (SGOT) U/L  --  15     Results from last 7 days   Lab Units 03/14/23  1719 03/14/23  1519   HSTROP T ng/L 17* 18*     No results found for: PROBNP     Lab Results   Component Value Date    INR 1.01 03/14/2023     Lab Results   Component Value Date    MG 2.0 11/20/2019     Lab Results   Component Value Date    TSH 0.999 03/14/2023      Pain Management Panel    There is no flowsheet data to display.       Imaging Results (Last 48 Hours)     ** No results found for the last 48 hours. **        ECHO:  Results for orders placed during the hospital encounter of 03/14/23    Adult Transthoracic Echo Complete W/ Cont if Necessary Per Protocol    Interpretation Summary  •  Left  ventricular systolic function is hyperdynamic (EF > 70%). Left ventricular ejection fraction appears to be greater than 70%, with almost closure of the left ventricular cavity know that the ventricular outflow tract gradient was done during the study, the study is also consistent with hypertrophic cardiomyopathy.  •  Left ventricular wall thickness is consistent with severe concentric hypertrophy.  •  Left ventricular diastolic function is consistent with (grade Ia w/high LAP) impaired relaxation.  •  Estimated right ventricular systolic pressure from tricuspid regurgitation is normal (<35 mmHg).  •  Pacemaker leads was seen in the right atrium and right ventricle.      STRESS TEST:  Results for orders placed during the hospital encounter of 03/14/23    Stress Test With Myocardial Perfusion One Day    Interpretation Summary  Images from the original result were not included.    •  A pharmacological stress test was performed using regadenoson without low-level exercise.  •  Findings consistent with a normal ECG stress test.  •  Myocardial perfusion imaging indicates a small-sized infarct located in the lateral wall with no significant ischemia noted.  •  Abnormal LV wall motion consistent with dyskinesis of the lateral and inferior wall.  •  Left ventricular ejection fraction is borderline normal (Calculated EF = 51%).  •  Impressions are consistent with a low risk study.      HEART CATH:  No results found for this or any previous visit.    Telemetry: Paced 60's           I reviewed the patient's new clinical results.    Medication Review:   amitriptyline, 25 mg, Oral, Nightly  amLODIPine, 5 mg, Oral, Daily  aspirin, 81 mg, Oral, Daily  atorvastatin, 20 mg, Oral, Daily  carbidopa-levodopa, 1 tablet, Oral, 4x Daily  cefTRIAXone, 1 g, Intravenous, Q24H  enoxaparin, 40 mg, Subcutaneous, Daily  metoprolol tartrate, 25 mg, Oral, Q12H  pantoprazole, 40 mg, Oral, BID AC  pramipexole, 0.5 mg, Oral, Nightly  senna-docusate  sodium, 2 tablet, Oral, Nightly  sertraline, 100 mg, Oral, Daily  sodium chloride, 10 mL, Intravenous, Q12H         Assessment        1. Chest pains with some typical and some atypical features for CCS class III-IV angina.  2. ASCVD, status post previous PCI/stenting at [facility] (details unavailable).  3. No evidence of myocardial ischemia but only a previous lateral wall myocardial infarction noted on the Lexiscan sestamibi study during this admission.  4. Status post permanent dual-chamber pacemaker implantation by  with generator change in 2019 with [employer] medical device.  5. Newly diagnosed atrial fibrillation with rapid ventricle response noted on the pacemaker interrogation earlier today.CHADSVASC score of 5.  Would require chronic anticoagulation for stroke prevention.  6. An episode of nonsustained V. tach lasting for about 20 beats noted on pacemaker interrogation as well  7. Intermittent palpitations with dizziness but no syncope.  8. Mildly elevated troponin of 18 with a flat trend which is of questionable significance.  9. History of parkinsonism with limited functional capacity.    Plan     1. We will add metoprolol for her atrial and ventricular arrhythmias.  2. Add Eliquis for stroke prevention.  3. Check magnesium level and try to keep the level between 2 and 2.2 and potassium level between 4 and 5.  4. I have discussed the results of the nuclear stress test with the patient and indicated no further cardiac evaluation is necessary at this time.  5. Continue with aspirin and atorvastatin.  6. If she remains stable with no further cardiac arrhythmias, she could be discharged home this weekend and follow-up in our office in 2 to 3 weeks.    I discussed the patients findings and my recommendations with patient.    Electronically signed by JOSUÉ Webber, 03/17/23, 10:57 AM EDT.     Electronically signed by Flako Pavon MD, 03/17/23, 5:15 PM  EDT.              Please note that portions of this note were completed with a voice recognition program.    Please note that portions of this note were copied and has been reviewed and is accurate as of date.

## 2023-03-17 NOTE — PLAN OF CARE
Goal Outcome Evaluation:      Pt ambulating to bathroom with no reports of chest pain or discomfort, denies further needs

## 2023-03-17 NOTE — PHARMACY PATIENT ASSISTANCE
Pharmacy was consulted for coverage/cost of eliquis/xarelto.  According to her insurance, she will have a $10.35 copay for either.    Thank You;  Lilliam Villa, PharmD  03/17/23  15:23 EDT

## 2023-03-17 NOTE — PLAN OF CARE
Goal Outcome Evaluation:  Patient is resting in bed watching television. Patient has tolerated all interventions. No complaints or concerns at this time. No signs of acute distress noted. Will continue to follow plan of care.

## 2023-03-18 VITALS
BODY MASS INDEX: 26.41 KG/M2 | OXYGEN SATURATION: 94 % | DIASTOLIC BLOOD PRESSURE: 62 MMHG | WEIGHT: 143.5 LBS | HEIGHT: 62 IN | HEART RATE: 66 BPM | SYSTOLIC BLOOD PRESSURE: 100 MMHG | TEMPERATURE: 97.9 F | RESPIRATION RATE: 18 BRPM

## 2023-03-18 PROCEDURE — 99239 HOSP IP/OBS DSCHRG MGMT >30: CPT | Performed by: HOSPITALIST

## 2023-03-18 PROCEDURE — G0378 HOSPITAL OBSERVATION PER HR: HCPCS

## 2023-03-18 RX ORDER — DEXLANSOPRAZOLE 60 MG/1
60 CAPSULE, DELAYED RELEASE ORAL DAILY
Qty: 30 CAPSULE | Refills: 0 | Status: SHIPPED | OUTPATIENT
Start: 2023-03-18

## 2023-03-18 RX ORDER — METOPROLOL TARTRATE 50 MG/1
50 TABLET, FILM COATED ORAL EVERY 12 HOURS SCHEDULED
Qty: 6 TABLET | Refills: 3 | Status: SHIPPED | OUTPATIENT
Start: 2023-03-18 | End: 2023-03-18 | Stop reason: SDUPTHER

## 2023-03-18 RX ORDER — CEFDINIR 300 MG/1
300 CAPSULE ORAL 2 TIMES DAILY
Qty: 6 CAPSULE | Refills: 0 | Status: SHIPPED | OUTPATIENT
Start: 2023-03-18

## 2023-03-18 RX ORDER — METOPROLOL TARTRATE 50 MG/1
50 TABLET, FILM COATED ORAL EVERY 12 HOURS SCHEDULED
Qty: 60 TABLET | Refills: 3 | Status: SHIPPED | OUTPATIENT
Start: 2023-03-18 | End: 2023-04-03 | Stop reason: SDUPTHER

## 2023-03-18 RX ORDER — CEFDINIR 300 MG/1
300 CAPSULE ORAL 2 TIMES DAILY
Qty: 6 CAPSULE | Refills: 0 | Status: SHIPPED | OUTPATIENT
Start: 2023-03-18 | End: 2023-03-18 | Stop reason: SDUPTHER

## 2023-03-18 RX ORDER — POTASSIUM CHLORIDE 20 MEQ/1
40 TABLET, EXTENDED RELEASE ORAL ONCE
Status: COMPLETED | OUTPATIENT
Start: 2023-03-18 | End: 2023-03-18

## 2023-03-18 RX ORDER — LANOLIN ALCOHOL/MO/W.PET/CERES
400 CREAM (GRAM) TOPICAL DAILY
Qty: 30 TABLET | Refills: 3 | Status: SHIPPED | OUTPATIENT
Start: 2023-03-18

## 2023-03-18 RX ORDER — MAGNESIUM OXIDE 400 MG/1
400 TABLET ORAL DAILY
Qty: 30 TABLET | Refills: 3 | Status: SHIPPED | OUTPATIENT
Start: 2023-03-18 | End: 2023-03-18 | Stop reason: SDUPTHER

## 2023-03-18 RX ADMIN — ASPIRIN 81 MG: 81 TABLET, CHEWABLE ORAL at 09:01

## 2023-03-18 RX ADMIN — APIXABAN 5 MG: 5 TABLET, FILM COATED ORAL at 09:01

## 2023-03-18 RX ADMIN — CARBIDOPA AND LEVODOPA 1 TABLET: 25; 250 TABLET ORAL at 14:48

## 2023-03-18 RX ADMIN — ANTACID TABLETS 3 TABLET: 500 TABLET, CHEWABLE ORAL at 09:48

## 2023-03-18 RX ADMIN — SERTRALINE 100 MG: 50 TABLET, FILM COATED ORAL at 09:01

## 2023-03-18 RX ADMIN — METOPROLOL TARTRATE 50 MG: 50 TABLET, FILM COATED ORAL at 09:01

## 2023-03-18 RX ADMIN — POTASSIUM CHLORIDE 40 MEQ: 20 TABLET, EXTENDED RELEASE ORAL at 09:48

## 2023-03-18 RX ADMIN — Medication 10 ML: at 09:00

## 2023-03-18 RX ADMIN — LOPERAMIDE HYDROCHLORIDE 4 MG: 2 CAPSULE ORAL at 00:38

## 2023-03-18 RX ADMIN — PANTOPRAZOLE SODIUM 40 MG: 40 TABLET, DELAYED RELEASE ORAL at 05:58

## 2023-03-18 RX ADMIN — ATORVASTATIN CALCIUM 20 MG: 20 TABLET, FILM COATED ORAL at 09:01

## 2023-03-18 RX ADMIN — CARBIDOPA AND LEVODOPA 1 TABLET: 25; 250 TABLET ORAL at 09:01

## 2023-03-18 NOTE — DISCHARGE SUMMARY
[facility] HOSPITALIST MEDICINE DISCHARGE SUMMARY    Patient Identification:  Name:  Layne Rodriguez  Age:  77 y.o.  Sex:  female  :  1945  MRN:  [id number]  Visit Number:  [id number]    Date of Admission: 3/14/2023  Date of Discharge: 2023  DISCHARGE DISPOSITION   Stable  PCP: Kreis, Samuel Duane, MD    DISCHARGE DIAGNOSIS : Presyncope and chest pain MI was ruled out, bigeminy with bradycardia noted on the day of admission per ER notes admission no recurrence, noted paroxysmal atrial fibrillation during interrogation of pacemaker CHADS-VASc score of 5, nonsustained V. tach 20 beats noted on interrogation, Parkinson's disease, advanced age, history of Monae's esophagus, large hiatal hernia, history of nonobstructive septal hypertrophy, acute cystitis without hematuria, essential hypertension, history of anxiety and insomnia, normocytic anemia stable, history of sick sinus syndrome status post [place] pacemaker placement.  Bacteremia Staphylococcus coagulase-negative likely contamination repeat culture no growth.  History of dyslipidemia.  History of atherosclerotic cardiovascular disease status post 3 stents in the past.  Osteoporosis.    HOSPITAL COURSE  Patient is a 77 y.o. female presented to [facility] complaining of substernal chest discomfort, intermittent headache, drawing of her right hand, palpitations.  At the emergency room patient had multiple episodes of PVC and bigeminy, x-rays were normal, troponin was minimally elevated but negative delta.  Work-up includes CT scan of the brain which is negative for CVA, she was admitted patient reported she has not been taking her Toprol as prescribed due to profound weakness.  She was started on Lopressor, 2D echo was ordered, cardiology consulted.  Carotid Doppler no significant lesion, patient underwent stress test which revealed fixed lateral wall small size infarct.  2D echo shows severe concentric hypertrophy with  almost closure of the left ventricular cavity consistent with hypertrophic cardiomyopathy pacemaker interrogation showed episodes of nonsustained V. tach 20 beats, also episodes of atrial fibrillation self-limited.  With CHADS-VASc score of 5, cardiology recommended full dose anticoagulation.  Rest of her stay was uneventful, telemetry failed to reveal bigeminy or PVCs, no recurrence of atrial fibrillation or NSVT.  Cardiology recommended maintain magnesium above 2 potassium above 4.  Plan is to discharge her home today, follow-up with her primary provider in weeks time posthospitalization follow-up, electrolytes follow-up, cardiology follow-up in 2 to 3 weeks.    Due to her multiple medical condition, Parkinson disease, changes on her medication, home PT has been ordered to evaluate safety, safety transfer, strengthening exercise supervision of medications    During her stay she was also noted to have acute cystitis without hematuria E. coli in culture treated and tolerated well, one of her blood culture on admission was positive for staph coagulase-negative, repeat culture so far no growth, suspected contamination.,  VITAL SIGNS:      03/16/23  0500 03/17/23  0500 03/18/23  0500   Weight: 65.7 kg (144 lb 12.8 oz) 65.3 kg (143 lb 14.4 oz) 65.1 kg (143 lb 8 oz)     Body mass index is 26.25 kg/m².  Vitals:    03/18/23 0600   BP: 115/57   Pulse: 62   Resp: 19   Temp: 98.2 °F (36.8 °C)   SpO2: 97%     PHYSICAL EXAM:  General: Comfortable,awake, alert, oriented to self, place, and time, well-developed and well-nourished.  No respiratory distress.    Skin:  Skin is warm and dry. No rash noted. No pallor.    HENT:  Head:  Normocephalic and atraumatic.  Mouth:  Moist mucous membranes.    Eyes:  Conjunctivae and EOM are normal.  Pupils are equal, round, and reactive to light.  No scleral icterus.    Neck:  Neck supple.  No JVD present.    No bruit  Pulmonary/Chest: Pacemaker no respiratory distress, no wheezes, no crackles,  with normal breath sounds and good air movement.  Cardiovascular: Murmur noted left lower sternal border, normal rate, regular rhythm and normal heart sounds   Abdominal:  Soft.  Bowel sounds are normal.  No distension and no tenderness.   Extremities:  No edema, no tenderness, and no deformity.  No red or swollen joints anywhere.  Strong pulses in all 4 extremities with no clubbing, no cyanosis, no edema.  Neurological:  Motor strength equal no obvious deficit, sensory grossly intact.   No cranial nerve deficit.  No tongue deviation.  No facial droop.  No slurred speech.    Genitourinary: No Sibley catheter  Back:  ----------    DISCHARGE MEDICATIONS:     Discharge Medications      New Medications      Instructions Start Date   apixaban 5 MG tablet tablet  Commonly known as: ELIQUIS   5 mg, Oral, Every 12 Hours Scheduled      cefdinir 300 MG capsule  Commonly known as: OMNICEF   300 mg, Oral, 2 Times Daily      magnesium oxide 400 MG tablet  Commonly known as: MAG-OX   400 mg, Oral, Daily      metoprolol tartrate 50 MG tablet  Commonly known as: LOPRESSOR   50 mg, Oral, Every 12 Hours Scheduled         Continue These Medications      Instructions Start Date   amitriptyline 25 MG tablet  Commonly known as: ELAVIL   25 mg, Oral, Nightly      aspirin 81 MG chewable tablet   81 mg, Oral, Daily      atorvastatin 20 MG tablet  Commonly known as: LIPITOR   20 mg, Oral, Daily      carbidopa-levodopa  MG per tablet  Commonly known as: SINEMET   1 tablet, Oral, 4 Times Daily      dexlansoprazole 60 MG capsule  Commonly known as: DEXILANT   60 mg, Oral, Daily      loratadine 10 MG tablet  Commonly known as: CLARITIN   10 mg, Oral, Daily      nitroglycerin 0.4 MG SL tablet  Commonly known as: NITROSTAT   0.4 mg, Sublingual, Every 5 Minutes PRN, Take no more than 3 doses in 15 minutes.       Omega 3 1000 MG capsule   1,000 mg, Oral, Daily      pramipexole 1 MG tablet  Commonly known as: MIRAPEX   0.5 mg, Oral, Nightly       sertraline 100 MG tablet  Commonly known as: ZOLOFT   100 mg, Oral, Daily         Stop These Medications    amLODIPine 5 MG tablet  Commonly known as: NORVASC     gabapentin 100 MG capsule  Commonly known as: NEURONTIN     metoprolol succinate XL 50 MG 24 hr tablet  Commonly known as: TOPROL-XL     omeprazole 20 MG capsule  Commonly known as: priLOSEC                  Additional Instructions for the Follow-ups that You Need to Schedule     Ambulatory Referral to Home Health   As directed      Face to Face Visit Date: 3/18/2023    Follow-up provider for Plan of Care?: I treated the patient in an acute care facility and will not continue treatment after discharge.    Follow-up provider: KREIS, SAMUEL DUANE [[id number]]    Reason/Clinical Findings: Patient with episodes of near syncope, history of sick sinus syndrome pacemaker placed, nonsustained V. tach, permission changes of medication, safety evaluation at home,    Describe mobility limitations that make leaving home difficult: Patient has episodes of near syncope, chest pain, sick sinus syndrome, needs company to leave the house    Nursing/Therapeutic Services Requested: Skilled Nursing Physical Therapy    Skilled nursing orders: Medication education    PT orders: Home safety assessment Gait Training Strengthening    Weight Bearing Status: As Tolerated    Frequency: 1 Week 1         Discharge Follow-up with PCP   As directed       Currently Documented PCP:    Kreis, Samuel Duane, MD    PCP Phone Number:    [phone]     Follow Up Details: Lisa Santana MD (posthospitalization follow-up, BP check, electrolytes checked including magnesium)         Discharge Follow-up with Specified Provider: Cardiology; 2 Weeks   As directed      To: Cardiology    Follow Up: 2 Weeks    Follow Up Details: Sick sinus syndrome, NSVT            Follow-up Information     Kreis, Samuel Duane, MD .    Specialty: Family Medicine  Why: Lisa Santana MD (posthospitalization follow-up, BP  check, electrolytes checked including magnesium)  Contact information:  [address] [place] [address] [address] [place] [address] [phone]                         Pending Labs     Order Current Status    Blood Culture - Blood, Arm, Left Preliminary result    Blood Culture - Blood, Hand, Right Preliminary result           The ASCVD Risk score (Leydi DK, et al., 2019) failed to calculate for the following reasons:    Cannot find a previous HDL lab    Cannot find a previous total cholesterol lab     Clara Woodard MD  03/18/23  09:02 EDT    Please note that this discharge summary required more than 30 minutes to complete.     resolution of symptoms. Follow up with ENT tomorrow.  keep packing in place.

## 2023-03-18 NOTE — PLAN OF CARE
Goal Outcome Evaluation:  Plan of Care Reviewed With: patient           Outcome Evaluation: Patient is resting quietly at this time. Earlier in shift patient was having some diarrhea. Patient states that she has been having diarrhea for 6 months. Imodium x1 given per order. Patient also c/o nausea and headache earlier. PRN medications given per order. Will continue to monitor and follow plan of care.

## 2023-03-18 NOTE — NURSING NOTE
Called Rebecca at [facility]([phone]). Faxing discharge summary, order and face sheet to [phone].  They will notify patient on Monday. Patient is aware.

## 2023-03-18 NOTE — NURSING NOTE
Notified Dr. Woodard that patient's hr got up to 120 very briefly and came back down.Checked vital signs bp 95/53 MAP 72,HR 66,O2 94./62 manually. She is feeling fine at this. Ok to be discharged. She is too seek medical attention if she feels palpitations or any other symptoms.

## 2023-03-19 ENCOUNTER — READMISSION MANAGEMENT (OUTPATIENT)
Dept: CALL CENTER | Facility: HOSPITAL | Age: 78
End: 2023-03-19
Payer: MEDICARE

## 2023-03-19 NOTE — PAYOR COMM NOTE
"[facility] LILIAN SARMIENTO  PHONE  [phone]  FAX  [phone]  NPI:  [id number]    PATIENT D/C 3/18/2023    Layne Rodriguez (77 y.o. Female)     Date of Birth   1945    Social Security Number       Address   [address] [place] [address] [address] [place] [address]    Home Phone   [phone]    MRN   [id number]       Hinduism   [employer]    Marital Status                               Admission Date   3/14/23    Admission Type   Emergency    Admitting Provider   Sergio Parker MD    Attending Provider       Department, Room/Bed   [facility], 3310/2S       Discharge Date   3/18/2023    Discharge Disposition   Home-Health Care [facility]    Discharge Destination                               Attending Provider: (none)   Allergies: Codeine, Reglan [Metoclopramide]    Isolation: None   Infection: None   Code Status: Prior    Ht: 157.5 cm (62\")   Wt: 65.1 kg (143 lb 8 oz)    Admission Cmt: None   Principal Problem: Chest pain, unspecified type [R07.9]                 Active Insurance as of 3/14/2023     Primary Coverage     Payor Plan Insurance Group Employer/Plan Group    [facility] MEDICARE REPLACEMENT [employer] MEDICARE REPLACEMENT      Payor Plan Address Payor Plan Phone Number Payor Plan Fax Number Effective Dates     [address] [address] [phone]  8/31/2020 - None Entered    [facility] [id number]       Subscriber Name Subscriber Birth Date Member ID       Layne Rodriguez 1945 [id number]                 Emergency Contacts      (Rel.) Home Phone Work Phone Mobile Phone    ZEESHAN SARMIENTO (Daughter) [phone] -- --              "

## 2023-03-19 NOTE — NURSING NOTE
PCA removed iv. The patient's family made staff aware that the patient was bleeding at the iv site where it was removed. Went into room and patient's family was holding pressure. She had bleed onto her gown and onto the hospital arabella. I placed new gauze and held more pressure. The bleeding did stop. Made Dr. Woodard aware and he wanted to keep the patient 2 more hours to watch her. The patient's grand daughter in law stated she worked in the medical field and she would be with her and would watch her. They wanted to go on home.

## 2023-03-19 NOTE — OUTREACH NOTE
Prep Survey    Flowsheet Row Responses   [facility] facility patient discharged from? [facility]   Is LACE score < 7 ? No   Eligibility Readm Mgmt   Discharge diagnosis paroxysmal atrial fibrillation    Does the patient have one of the following disease processes/diagnoses(primary or secondary)? Other   Does the patient have Home health ordered? No   Is there a DME ordered? No   Prep survey completed? Yes          Homa AGUILA - Registered Nurse

## 2023-03-20 ENCOUNTER — TELEPHONE (OUTPATIENT)
Dept: TELEMETRY | Facility: HOSPITAL | Age: 78
End: 2023-03-20
Payer: MEDICARE

## 2023-03-20 LAB
BACTERIA SPEC AEROBE CULT: ABNORMAL
GRAM STN SPEC: ABNORMAL
ISOLATED FROM: ABNORMAL

## 2023-03-20 NOTE — DISCHARGE PLACEMENT REQUEST
[address] [address]  Phone:  [phone]  Fax:  [phone] Date: Mar 18, 2023      Ambulatory Referral to Home Health     Patient:  Layne Rodriguez MRN:  [id number]   [address] [address] :  1945  SSN:    Phone: [phone] Sex:  F      INSURANCE PAYOR PLAN GROUP # SUBSCRIBER ID   Primary:    [facility] MEDICARE REPLACEMENT [id number]   [id number]      Referring Provider Information:  CHEPE RASHID Phone: [phone] Fax: [phone]       Referral Information:   # Visits:  999 Referral Type: Home Health [42]   Urgency:  Routine Referral Reason: Specialty Services Required   Start Date: Mar 18, 2023 End Date:  To be determined by Insurer   Diagnosis: Chest pain, unspecified type (R07.9 [ICD-10-CM] 786.50 [ICD-9-CM])  Near syncope (R55 [ICD-10-CM] 780.2 [ICD-9-CM])  Tachy-shanon syndrome (HCC) (I49.5 [ICD-10-CM] 427.81 [ICD-9-CM])  Cardiac pacemaker (Z95.0 [ICD-10-CM] V45.01 [ICD-9-CM])  Sick sinus syndrome (HCC) (I49.5 [ICD-10-CM] 427.81 [ICD-9-CM])      Refer to Dept:   Refer to Provider:   Refer to Provider Phone:   Refer to Facility:       Face to Face Visit Date: 3/18/2023  Follow-up provider for Plan of Care? I treated the patient in an acute care facility and will not continue treatment after discharge.  Follow-up provider: KREIS, SAMUEL DUANE [[id number]]  Reason/Clinical Findings: Patient with episodes of near syncope, history of sick sinus syndrome pacemaker placed, nonsustained V. tach, permission changes of medication, safety evaluation at home,  Describe mobility limitations that make leaving home difficult: Patient has episodes of near syncope, chest pain, sick sinus syndrome, needs company to leave the house  Nursing/Therapeutic Services Requested: Skilled Nursing  Nursing/Therapeutic Services Requested: Physical Therapy  Skilled nursing orders: Medication education  PT orders: Home safety assessment  PT  "orders: Gait Training  PT orders: Strengthening  Weight Bearing Status: As Tolerated  Frequency: 1 Week 1     This document serves as a request of services and does not constitute Insurance authorization or approval of services.  To determine eligibility, please contact the members Insurance carrier to verify and review coverage.     If you have medical questions regarding this request for services. Please contact [address] at [phone] during normal business hours.        Authorizing Provider:Clara Woodard MD  Authorizing Provider's NPI: [id number]  Order Entered By: Clara Woodard MD 3/18/2023  8:56 AM     Electronically signed by: Clara Woodard MD 3/18/2023  8:56 AM       Layne Rodriguez (77 y.o. Female)     Date of Birth   1945    Social Security Number       Address   [address] [place] [address]    Home Phone   [phone]    MRN   [id number]       Sabianism   [employer]    Marital Status                               Admission Date   3/14/23    Admission Type   Emergency    Admitting Provider   Sergio Parker MD    Attending Provider       Department, Room/Bed   [address], 3310/2S       Discharge Date   3/18/2023    Discharge Disposition   Home-Health Care [facility]    Discharge Destination                               Attending Provider: (none)   Allergies: Codeine, Reglan [Metoclopramide]    Isolation: None   Infection: None   Code Status: Prior    Ht: 157.5 cm (62\")   Wt: 65.1 kg (143 lb 8 oz)    Admission Cmt: None   Principal Problem: Chest pain, unspecified type [R07.9]                 Active Insurance as of 3/14/2023     Primary Coverage     Payor Plan Insurance Group Employer/Plan Group    [facility]IC[facility]PLACEMENT [facility]      Payor Plan Address Payor Plan Phone Number Payor Plan Fax Number Effective Dates    [address] [address] [phone]  8/31/2020 - None Entered    " "[facility] [address]       Subscriber Name Subscriber Birth Date Member ID       Layne Rodriguez 1945 [id number]                 Emergency Contacts      (Rel.) Home Phone Work Phone Mobile Phone    ZEESHAN SARMIENTO (Daughter) [phone] -- --            Insurance Information                [facility] MEDICARE REPLACEMENT/[employer] MEDICARE REPLACEMENT Phone: [phone]    Subscriber: Layne Rodriguez Subscriber#: [id number]    Group#: -- Precert#: --             History & Physical      Sergio Parker MD at 23              [facility]    Patient Identification:  Name:  Layne Rodriguez  Age:  77 y.o.  Sex:  female  :  1945  MRN:  [id number]   Visit Number:  [id number]  Admit Date: 3/14/2023   Room number:  406/06  Primary Care Physician:  Kreis, Samuel Duane, MD    Date of Admission: 3/14/2023     Subjective     Chief complaint:    Chief Complaint   Patient presents with   • Headache     History of presenting illness:  77 y.o. female who was admitted on 3/14/2023 w/palpitations and HA.    Patient has known PMH of SSS s/p St. Artie PM w/generator change  presenting today with multi-week hx of increasingly frequent palpitations associated with HA, presyncopal episodes, and pain radiating into her neck. Sx worsen with activity and have also began to occur at rest without clear alleviating factor. Duration is short lived and denies associated dyspnea or diaphoresis, quickly returns to baseline a few seconds to minutes after episode starts. Patient states it has been around 4 years since she saw her cardiologist Dr. Pavon but has been compliant with all medications with exception of her metoprolol which she splits in half because the 50mg succinate dose caused her to have intolerable fatigue.     In ED she ambulated to bathroom then called out to nursing staff that she felt like she was \"going to go out\". At that time tele monitor noted " bigeminy with bradycardia in 40s reported however PA arrived at bedside and reported palpable HR was around 76 bpm with sx resolved by the time provider arrived at bedside.    Given above, it was decided to admit patient to tele unit for overnight observation.    As side note, patient did have UA with bacteria in ED and was given one dose CTX and does endorse suprapubic pain that worsens with urination along with infrequent incontinence but denies fever or chills. No back pain or cva tenderness.    Prior to admission I discussed patient's presentation and management with attending ER physician and verbal handoff received.  ---------------------------------------------------------------------------------------------------------------------   A thorough systems based relevant ROS was asked and was negative except as noted above.  ---------------------------------------------------------------------------------------------------------------------   Past Medical History:   Diagnosis Date   • Anemia    • Arthritis    • ASCVD (arteriosclerotic cardiovascular disease)    • Monae's esophagus    • COPD (chronic obstructive pulmonary disease) (CMS/[facility])    • Coronary artery disease    • Dyslipidemia    • Essential hypertension    • GERD (gastroesophageal reflux disease)    • Myocardial infarction (CMS/[facility])    • Osteoporosis      Past Surgical History:   Procedure Laterality Date   • BACK SURGERY     • CARDIAC ELECTROPHYSIOLOGY PROCEDURE N/A 12/3/2019    Procedure: PPM battery change;  Surgeon: Flako Pavon MD;  Location: [facility] INVASIVE LOCATION;  Service: Cardiology   • CORONARY STENT PLACEMENT      x 3   • HYSTERECTOMY     • INSERT / REPLACE / REMOVE PACEMAKER     • PACEMAKER IMPLANTATION       Family History   Problem Relation Age of Onset   • Stroke Mother    • Heart disease Mother    • Stroke Father    • Hypertension Brother    • Hypertension Sister    • Stroke Sister    • Hypertension Sister    • Hypertension  Sister    • Hypertension Sister    • Hypertension Brother    • Hypertension Brother    • Hypertension Brother      Social History     Socioeconomic History   • Marital status:    • Number of children: 5   Tobacco Use   • Smoking status: Never   • Smokeless tobacco: Never   Substance and Sexual Activity   • Alcohol use: No   • Drug use: No   • Sexual activity: Defer     ---------------------------------------------------------------------------------------------------------------------   Allergies:  Codeine and Reglan [metoclopramide]  ---------------------------------------------------------------------------------------------------------------------   Medications below are reported home medications pulling from within the system; at this time, these medications have not been reconciled unless otherwise specified and are in the verification process for further verifcation as current home medications.      Prior to Admission Medications     Prescriptions Last Dose Informant Patient Reported? Taking?    amitriptyline (ELAVIL) 100 MG tablet   No No    Take 0.5 tablets by mouth Every Night.    amLODIPine (NORVASC) 10 MG tablet   No No    Take 1 tablet by mouth Daily.    aspirin 81 MG chewable tablet   Yes No    Chew 81 mg Daily.    atorvastatin (LIPITOR) 20 MG tablet   Yes No    Take 20 mg by mouth Daily.    dexlansoprazole (DEXILANT) 60 MG capsule   Yes No    Take 60 mg by mouth Daily.    loratadine (CLARITIN) 10 MG tablet   Yes No    Take 10 mg by mouth Daily.    metoprolol succinate XL (TOPROL-XL) 50 MG 24 hr tablet   No No    TAKE 1 TABLET BY MOUTH ONCE DAILY    niacin 500 MG tablet   Yes No    Take 500 mg by mouth Every Night.    nitroglycerin (NITROSTAT) 0.4 MG SL tablet   Yes No    Place 0.4 mg under the tongue Every 5 (Five) Minutes As Needed for Chest Pain. Take no more than 3 doses in 15 minutes.    Omega 3 1000 MG capsule   Yes No    Take  by mouth Daily.    pramipexole (MIRAPEX) 1 MG tablet   Yes No     Take 1 mg by mouth Every Night.    sertraline (ZOLOFT) 100 MG tablet   Yes No    Take 100 mg by mouth Daily.    VITAMIN B1-B12 IM   Yes No    Inject  into the appropriate muscle as directed by prescriber Every 30 (Thirty) Days.        Objective     Vital Signs:  Temp:  [97.3 °F (36.3 °C)] 97.3 °F (36.3 °C)  Heart Rate:  [71-84] 71  Resp:  [17] 17  BP: (132-147)/(67-77) 137/67    Mean Arterial Pressure (Non-Invasive) for the past 24 hrs (Last 3 readings):   Noninvasive MAP (mmHg)   03/14/23 1640 102   03/14/23 1620 100   03/14/23 1600 106     SpO2:  [95 %-97 %] 95 %  on   ;   Device (Oxygen Therapy): room air  Body mass index is 25.79 kg/m².    Wt Readings from Last 3 Encounters:   03/14/23 64 kg (141 lb)   03/10/21 68.7 kg (151 lb 6.4 oz)   01/09/20 68.5 kg (151 lb)      ----------------------------------------------------------------------------------------------------------------------  Physical Exam  Constitutional:       General: She is not in acute distress.  Cardiovascular:      Rate and Rhythm: Normal rate. Rhythm irregular.      Heart sounds: Murmur (consistent ejection murmur unchanged with inspiration or positional change) heard.   Pulmonary:      Effort: Pulmonary effort is normal.      Breath sounds: No wheezing or rales.   Abdominal:      General: Abdomen is flat.      Palpations: Abdomen is soft.      Tenderness: There is no abdominal tenderness (No suprapubic TTP). There is no right CVA tenderness or left CVA tenderness.   Musculoskeletal:      Right lower leg: No edema.      Left lower leg: No edema.   Skin:     General: Skin is warm and dry.      Capillary Refill: Capillary refill takes 2 to 3 seconds.   Neurological:      General: No focal deficit present.      Mental Status: She is alert and oriented to person, place, and time.   Psychiatric:         Mood and Affect: Mood normal.         Behavior: Behavior normal.        --------------------------------------------------------------------------------------------------------------------  LABS:    CBC and coagulation:  Results from last 7 days   Lab Units 03/14/23  1556 03/14/23  1519   PROCALCITONIN ng/mL  --  <0.02   LACTATE mmol/L 0.9  --    CRP mg/dL  --  <0.30   WBC 10*3/mm3  --  9.35   HEMOGLOBIN g/dL  --  11.4*   HEMATOCRIT %  --  37.2   MCV fL  --  79.3   MCHC g/dL  --  30.6*   PLATELETS 10*3/mm3  --  283   INR   --  1.01     Acid/base balance:      Renal and electrolytes:  Results from last 7 days   Lab Units 03/14/23  1519   SODIUM mmol/L 140   POTASSIUM mmol/L 4.0   CHLORIDE mmol/L 103   CO2 mmol/L 26.0   BUN mg/dL 24*   CREATININE mg/dL 0.73   CALCIUM mg/dL 9.6   GLUCOSE mg/dL 109*     Estimated Creatinine Clearance: 57.6 mL/min (by C-G formula based on SCr of 0.73 mg/dL).    Liver and pancreatic function:  Results from last 7 days   Lab Units 03/14/23  1519   ALBUMIN g/dL 3.8   BILIRUBIN mg/dL <0.2   ALK PHOS U/L 67   AST (SGOT) U/L 15   ALT (SGPT) U/L <5     Endocrine function:  No results found for: HGBA1C  Point of care bedside glucose levels:      No results found for: TSH, FREET4  Cardiac:  Results from last 7 days   Lab Units 03/14/23  1719 03/14/23  1519   HSTROP T ng/L 17* 18*       Cultures:  Lab Results   Component Value Date    COLORU Dark Yellow (A) 03/14/2023    CLARITYU Cloudy (A) 03/14/2023    PHUR >=9.0 (H) 03/14/2023    GLUCOSEU Negative 03/14/2023    KETONESU Negative 03/14/2023    BLOODU Negative 03/14/2023    NITRITEU Positive (A) 03/14/2023    LEUKOCYTESUR Moderate (2+) (A) 03/14/2023    BILIRUBINUR Negative 03/14/2023    UROBILINOGEN 1.0 E.U./dL 03/14/2023    RBCUA 6-12 (A) 03/14/2023    WBCUA Too Numerous to Count (A) 03/14/2023    BACTERIA 4+ (A) 03/14/2023     Microbiology Results (last 10 days)     Procedure Component Value - Date/Time    COVID-19 and FLU A/B PCR - Swab, Nasopharynx [[id number]]  (Normal) Collected: 03/14/23 1554    Lab  Status: Final result Specimen: Swab from Nasopharynx Updated: 03/14/23 1631     COVID19 Not Detected     Influenza A PCR Not Detected     Influenza B PCR Not Detected    Narrative:      Fact sheet for providers: https://www.fda.gov/media/453076/download    Fact sheet for patients: https://www.fda.gov/media/243829/download    Test performed by PCR.          No results found for: PREGTESTUR, PREGSERUM, HCG, HCGQUANT  Pain Management Panel    There is no flowsheet data to display.         I have personally looked at the labs and they are summarized above.  ----------------------------------------------------------------------------------------------------------------------  Detailed radiology reports for the last 24 hours:    Imaging Results (Last 24 Hours)     Procedure Component Value Units Date/Time    CT Head Without Contrast [[id number]] Collected: 03/14/23 1549     Updated: 03/14/23 1552    Narrative:      EXAM:    CT Head Without Intravenous Contrast     EXAM DATE:    3/14/2023 3:30 PM     CLINICAL HISTORY:    headache     TECHNIQUE:    Axial computed tomography images of the head/brain without intravenous  contrast.  Sagittal and coronal reformatted images were created and  reviewed.  This CT exam was performed using one or more of the following  dose reduction techniques:  automated exposure control, adjustment of  the mA and/or kV according to patient size, and/or use of iterative  reconstruction technique.     COMPARISON:    No relevant prior studies available.     FINDINGS:    Brain:  Unremarkable.  No hemorrhage.  No significant white matter  disease.  No edema.    Ventricles:  Unremarkable.  No ventriculomegaly.    Bones/joints:  Unremarkable.  No acute fracture.    Soft tissues:  Unremarkable.    Sinuses:  Unremarkable as visualized.  No acute sinusitis.    Mastoid air cells:  Unremarkable as visualized.  No mastoid effusion.       Impression:        Unremarkable exam demonstrating no CT evidence of acute  intracranial  findings.     This report was finalized on 3/14/2023 3:50 PM by Dr. Magdaleno Matute MD.       XR Chest AP [[id number]] Collected: 03/14/23 1549     Updated: 03/14/23 1552    Narrative:      EXAM:    XR Chest, 1 View     EXAM DATE:    3/14/2023 3:30 PM     CLINICAL HISTORY:    chest pain     TECHNIQUE:    Frontal view of the chest.     COMPARISON:    03/09/2007     FINDINGS:    Lungs:  Chronic appearing lung changes.  No focal airspace disease  identified.    Pleural space:  Unremarkable.  No pneumothorax.    Heart:  Mild cardiomegaly.    Mediastinum:  Large hiatal hernia.    Bones/joints:  Unremarkable.    Tubes, lines and devices:  Left cardiac pacer device.       Impression:      1.  No acute cardiopulmonary findings.  2.  Large hiatal hernia.  3.  Mild cardiac enlargement.     This report was finalized on 3/14/2023 3:49 PM by Dr. Magdaleno Matute MD.           Final impressions for the last 30 days of radiology reports:    CT Head Without Contrast    Result Date: 3/14/2023    Unremarkable exam demonstrating no CT evidence of acute intracranial findings.  This report was finalized on 3/14/2023 3:50 PM by Dr. Magdaleno Matute MD.      XR Chest AP    Result Date: 3/14/2023  1.  No acute cardiopulmonary findings. 2.  Large hiatal hernia. 3.  Mild cardiac enlargement.  This report was finalized on 3/14/2023 3:49 PM by Dr. Magdaleno Matute MD.        I have personally looked at the radiology images, my personal interpretation is as follows:    CXR reviewed without significant cardiomegaly or pleural effusions    Assessment & Plan       #SSS (tachy-shanon syndrome) s/p St.Artie PM w/frequent symptomatic PVCs  -During exam tele noted to have frequent PVCs likely explaining her sx with transitory drop in cardiac output, unsure if patient having true bradycardia. Troponin without delta and sx not convincing for ischemic cause  -Will monitor closely on tele overnight, briefly discussed case with on-call cardiologist and  agree with plan to monitor while titrating PO beta blocker  -To suppress PVCs, will increase betablocker to 25mg tartrate BID (was taking 25mg succinate daily) with dose this evening while monitoring PVC burden overnight. Will uptitrate as needed as frequently as q8hr  -Cardiology consult placed and device interrogation ordered for am    #Hx of non-obstructive septal hypertrophy  -Murmur on exam consistent and previous echos reviewed without low EF, mod mitral regurg, and mild to mod septal hypertrophy without outflow obstruction  -Repeat TTE ordered (last echo 2021)    #Acute bacterial UTI  -Cont CTX daily for 5 days total abx, transition to po at discharge  -f/u urine cx    - - Chronic conditions - -     #HTN: Cont amlodipine  #HLD: Cont statin  #Insomnia/anxiety: Resume home elavil/sertraline  #Normocytic anemia: Chronic in nature, no evidence of acute blood loss  #Parkonsons Disease: Well managed, Resume Sinemet      VTE Prophylaxis:   Mechanical Order History:     None      Pharmalogical Order History:      Ordered     Dose Route Frequency Stop    Signed and Held  Enoxaparin Sodium (LOVENOX) syringe 40 mg         40 mg SC Daily --              The patient is high risk due to the following diagnoses/reasons:  SSS hx w/PVCs and PM, acute UTI    Admission Status:  I certify that this patient requires inpatient hospitalization for greater than 2 midnights in INPATIENT status.  I anticipate there to be the need for care which can only be reasonably provided in a hospital setting such as possible need for aggressive/expedited ancillary services and/or consultation services, IV medications, close physician monitoring, and/or procedures. In such, I feel patient’s risk for adverse outcomes and need for care warrant INPATIENT evaluation and predict the patient’s care encounter to likely last beyond 2 midnights.    Code Status and Medical Interventions:   Ordered at: 03/14/23 4812     Code Status (Patient has no pulse and  is not breathing):    CPR (Attempt to Resuscitate)     Medical Interventions (Patient has pulse or is breathing):    Full Support        Disposition: Admit to tele obs    Sergio Parker MD  [facility] Hospitalist  03/14/23  19:40 EDT        Electronically signed by Sergio Parker MD at 03/14/23 5442         No current facility-administered medications for this encounter.     Current Outpatient Medications   Medication Sig Dispense Refill   • amitriptyline (ELAVIL) 25 MG tablet Take 1 tablet by mouth Every Night.     • apixaban (ELIQUIS) 5 MG tablet tablet Take 1 tablet by mouth Every 12 (Twelve) Hours. Indications: Atrial Fibrillation 60 tablet 3   • aspirin 81 MG chewable tablet Chew 1 tablet Daily.     • atorvastatin (LIPITOR) 20 MG tablet Take 1 tablet by mouth Daily.     • carbidopa-levodopa (SINEMET)  MG per tablet Take 1 tablet by mouth 4 (Four) Times a Day.     • cefdinir (OMNICEF) 300 MG capsule Take 1 capsule by mouth 2 (Two) Times a Day. 6 capsule 0   • dexlansoprazole (DEXILANT) 60 MG capsule Take 1 capsule by mouth Daily. 30 capsule 0   • loratadine (CLARITIN) 10 MG tablet Take 1 tablet by mouth Daily.     • Magnesium Oxide 400 (240 Mg) MG tablet Take 1 tablet by mouth Daily. 30 tablet 3   • metoprolol tartrate (LOPRESSOR) 50 MG tablet Take 1 tablet by mouth Every 12 (Twelve) Hours. 60 tablet 3   • nitroglycerin (NITROSTAT) 0.4 MG SL tablet Place 1 tablet under the tongue Every 5 (Five) Minutes As Needed for Chest Pain. Take no more than 3 doses in 15 minutes.     • Omega 3 1000 MG capsule Take 1,000 mg by mouth Daily.     • pramipexole (MIRAPEX) 1 MG tablet Take 0.5 mg by mouth Every Night.     • sertraline (ZOLOFT) 100 MG tablet Take 1 tablet by mouth Daily.          Physician Progress Notes (most recent note)      Clara Woodard MD at 03/17/23 9468              [facility] HOSPITALIST PROGRESS NOTE     Patient Identification:  Name:  Layne Rodriguez  Age:  77  y.o.  Sex:  female  :  1945  MRN:  [id number]  Visit Number:  [id number]  Primary Care Provider:  Kreis, Samuel Duane, MD    Length of stay:  0    Subjective: Patient denies recurrence of chest pain no palpitation, vital signs stable no arrhythmia on telemetry, stress test low risk study small infarct lateral wall with no reversible ischemia.  Awaiting for pacemaker interrogation.  Cardiology help appreciated     Chief Complaint: Chest pain  ----------------------------------------------------------------------------------------------------------------------  Current Hospital Meds:  amitriptyline, 25 mg, Oral, Nightly  aspirin, 81 mg, Oral, Daily  atorvastatin, 20 mg, Oral, Daily  carbidopa-levodopa, 1 tablet, Oral, 4x Daily  cefTRIAXone, 1 g, Intravenous, Q24H  enoxaparin, 40 mg, Subcutaneous, Daily  metoprolol tartrate, 50 mg, Oral, Q12H  pantoprazole, 40 mg, Oral, BID AC  pramipexole, 0.5 mg, Oral, Nightly  senna-docusate sodium, 2 tablet, Oral, Nightly  sertraline, 100 mg, Oral, Daily  sodium chloride, 10 mL, Intravenous, Q12H         ----------------------------------------------------------------------------------------------------------------------  Vital Signs:  Temp:  [96.8 °F (36 °C)-98.1 °F (36.7 °C)] 96.8 °F (36 °C)  Heart Rate:  [65-80] 65  Resp:  [18] 18  BP: (113-142)/(57-72) 121/71       Tele: Paced rhythm 69 bpm      03/15/23  0545 23  0500 23  0500   Weight: 65.8 kg (145 lb 1.6 oz) (admit weight less than 24 hours) 65.7 kg (144 lb 12.8 oz) 65.3 kg (143 lb 14.4 oz)     Body mass index is 26.32 kg/m².    Intake/Output Summary (Last 24 hours) at 3/17/2023 1502  Last data filed at 3/16/2023 1800  Gross per 24 hour   Intake 120 ml   Output --   Net 120 ml     Diet: Regular/House Diet; Texture: Regular Texture (IDDSI 7); Fluid Consistency: Thin (IDDSI 0)  ----------------------------------------------------------------------------------------------------------------------  Physical  exam:  General: Comfortable,awake, alert, oriented to self, place, and time, well-developed and well-nourished.  No respiratory distress.    Skin:  Skin is warm and dry. No rash noted. No pallor.    HENT:  Head:  Normocephalic and atraumatic.  Mouth:  Moist mucous membranes.    Eyes:  Conjunctivae and EOM are normal.  Pupils are equal, round, and reactive to light.  No scleral icterus.    Neck:  Neck supple.  No JVD present.    Pulmonary/Chest: Icemaker no respiratory distress, no wheezes, no crackles, with normal breath sounds and good air movement.  Cardiovascular:  Normal rate, regular rhythm and normal heart sounds with no murmur.  Abdominal:  Soft.  Bowel sounds are normal.  No distension and no tenderness.   Extremities:  No edema, no tenderness, and no deformity.  No red or swollen joints anywhere.  Strong pulses in all 4 extremities with no clubbing, no cyanosis, no edema.  Neurological:  Motor strength equal no obvious deficit, sensory grossly intact.   No cranial nerve deficit.  No tongue deviation.  No facial droop.  No slurred speech.    Genitourinary: No Sibley catheter  Back:  ----------------------------------------------------------------------------------------------------------------------  ----------------------------------------------------------------------------------------------------------------------  Results from last 7 days   Lab Units 03/14/23  1719 03/14/23  1519   HSTROP T ng/L 17* 18*     Results from last 7 days   Lab Units 03/15/23  0510 03/14/23  1556 03/14/23  1519   CRP mg/dL  --   --  <0.30   LACTATE mmol/L  --  0.9  --    WBC 10*3/mm3 8.06  --  9.35   HEMOGLOBIN g/dL 10.5*  --  11.4*   HEMATOCRIT % 35.5  --  37.2   MCV fL 79.1  --  79.3   MCHC g/dL 29.6*  --  30.6*   PLATELETS 10*3/mm3 254  --  283   INR   --   --  1.01         Results from last 7 days   Lab Units 03/15/23  0510 03/14/23  1519   SODIUM mmol/L 139 140   POTASSIUM mmol/L 3.8 4.0   CHLORIDE mmol/L 105 103   CO2  mmol/L 26.6 26.0   BUN mg/dL 23 24*   CREATININE mg/dL 0.63 0.73   CALCIUM mg/dL 9.2 9.6   GLUCOSE mg/dL 101* 109*   ALBUMIN g/dL  --  3.8   BILIRUBIN mg/dL  --  <0.2   ALK PHOS U/L  --  67   AST (SGOT) U/L  --  15   ALT (SGPT) U/L  --  <5   Estimated Creatinine Clearance: 67.4 mL/min (by C-G formula based on SCr of 0.63 mg/dL).    No results found for: AMMONIA      Blood Culture   Date Value Ref Range Status   03/16/2023 No growth at 24 hours  Preliminary   03/14/2023 Staphylococcus, coagulase negative (C)  Final   03/14/2023 No growth at 2 days  Preliminary     Urine Culture   Date Value Ref Range Status   03/14/2023 >100,000 CFU/mL Escherichia coli (A)  Final     No results found for: WOUNDCX  No results found for: STOOLCX    I have personally looked at the labs and they are summarized above.  ----------------------------------------------------------------------------------------------------------------------  Imaging Results (Last 24 Hours)     ** No results found for the last 24 hours. **        ----------------------------------------------------------------------------------------------------------------------  Assessment and Plan:  -Chest pain with atypical and typical features, troponin positive delta negative remained flat  -Large hiatal hernia  -History of sick sinus syndrome status post pacemaker placed  -Bacteremia suspected contamination repeat cultures done  -Acute cystitis without hematuria E. coli on culture  -Apparent history of Monae's esophagus  -Movement disorder/Parkinson's disease    Awaiting for interrogation of pacemaker, patient currently no recurrence of chest discomfort, ambulate as tolerated we will monitor closely hopefully home soon    Disposition Home when stable      Clara Woodard MD  03/17/23  15:02 EDT    Electronically signed by Clara Woodard MD at 03/17/23 5944       Physical Therapy Notes (most recent note)    No notes exist for this encounter.          Occupational Therapy Notes (most recent note)    No notes exist for this encounter.         Speech Language Pathology Notes (most recent note)    No notes exist for this encounter.         ADL Documentation (most recent)    Flowsheet Row Most Recent Value   Transferring 0 - independent   Toileting 0 - independent   Bathing 0 - independent   Dressing 0 - independent   Eating 0 - independent   Communication 0 - understands/communicates without difficulty   Swallowing 0 - swallows foods/liquids without difficulty   Equipment Currently Used at Home none             Discharge Summary      Clara Woodard MD at 23 0902              [facility] HOSPITALIST MEDICINE DISCHARGE SUMMARY    Patient Identification:  Name:  Layne Rodriguez  Age:  77 y.o.  Sex:  female  :  1945  MRN:  [id number]  Visit Number:  [id number]    Date of Admission: 3/14/2023  Date of Discharge: 2023  DISCHARGE DISPOSITION   Stable  PCP: Kreis, Samuel Duane, MD    DISCHARGE DIAGNOSIS : Presyncope and chest pain MI was ruled out, bigeminy with bradycardia noted on the day of admission per ER notes admission no recurrence, noted paroxysmal atrial fibrillation during interrogation of pacemaker CHADS-VASc score of 5, nonsustained V. tach 20 beats noted on interrogation, Parkinson's disease, advanced age, history of Monae's esophagus, large hiatal hernia, history of nonobstructive septal hypertrophy, acute cystitis without hematuria, essential hypertension, history of anxiety and insomnia, normocytic anemia stable, history of sick sinus syndrome status post [place] pacemaker placement.  Bacteremia Staphylococcus coagulase-negative likely contamination repeat culture no growth.  History of dyslipidemia.  History of atherosclerotic cardiovascular disease status post 3 stents in the past.  Osteoporosis.    HOSPITAL COURSE  Patient is a 77 y.o. female presented to [facility] complaining of substernal  chest discomfort, intermittent headache, drawing of her right hand, palpitations.  At the emergency room patient had multiple episodes of PVC and bigeminy, x-rays were normal, troponin was minimally elevated but negative delta.  Work-up includes CT scan of the brain which is negative for CVA, she was admitted patient reported she has not been taking her Toprol as prescribed due to profound weakness.  She was started on Lopressor, 2D echo was ordered, cardiology consulted.  Carotid Doppler no significant lesion, patient underwent stress test which revealed fixed lateral wall small size infarct.  2D echo shows severe concentric hypertrophy with almost closure of the left ventricular cavity consistent with hypertrophic cardiomyopathy pacemaker interrogation showed episodes of nonsustained V. tach 20 beats, also episodes of atrial fibrillation self-limited.  With CHADS-VASc score of 5, cardiology recommended full dose anticoagulation.  Rest of her stay was uneventful, telemetry failed to reveal bigeminy or PVCs, no recurrence of atrial fibrillation or NSVT.  Cardiology recommended maintain magnesium above 2 potassium above 4.  Plan is to discharge her home today, follow-up with her primary provider in weeks time posthospitalization follow-up, electrolytes follow-up, cardiology follow-up in 2 to 3 weeks.    Due to her multiple medical condition, Parkinson disease, changes on her medication, home PT has been ordered to evaluate safety, safety transfer, strengthening exercise supervision of medications    During her stay she was also noted to have acute cystitis without hematuria E. coli in culture treated and tolerated well, one of her blood culture on admission was positive for staph coagulase-negative, repeat culture so far no growth, suspected contamination.,  VITAL SIGNS:      03/16/23  0500 03/17/23  0500 03/18/23  0500   Weight: 65.7 kg (144 lb 12.8 oz) 65.3 kg (143 lb 14.4 oz) 65.1 kg (143 lb 8 oz)     Body mass  index is 26.25 kg/m².  Vitals:    03/18/23 0600   BP: 115/57   Pulse: 62   Resp: 19   Temp: 98.2 °F (36.8 °C)   SpO2: 97%     PHYSICAL EXAM:  General: Comfortable,awake, alert, oriented to self, place, and time, well-developed and well-nourished.  No respiratory distress.    Skin:  Skin is warm and dry. No rash noted. No pallor.    HENT:  Head:  Normocephalic and atraumatic.  Mouth:  Moist mucous membranes.    Eyes:  Conjunctivae and EOM are normal.  Pupils are equal, round, and reactive to light.  No scleral icterus.    Neck:  Neck supple.  No JVD present.    No bruit  Pulmonary/Chest: Pacemaker no respiratory distress, no wheezes, no crackles, with normal breath sounds and good air movement.  Cardiovascular: Murmur noted left lower sternal border, normal rate, regular rhythm and normal heart sounds   Abdominal:  Soft.  Bowel sounds are normal.  No distension and no tenderness.   Extremities:  No edema, no tenderness, and no deformity.  No red or swollen joints anywhere.  Strong pulses in all 4 extremities with no clubbing, no cyanosis, no edema.  Neurological:  Motor strength equal no obvious deficit, sensory grossly intact.   No cranial nerve deficit.  No tongue deviation.  No facial droop.  No slurred speech.    Genitourinary: No Sibley catheter  Back:  ----------    DISCHARGE MEDICATIONS:     Discharge Medications      New Medications      Instructions Start Date   apixaban 5 MG tablet tablet  Commonly known as: ELIQUIS   5 mg, Oral, Every 12 Hours Scheduled      cefdinir 300 MG capsule  Commonly known as: OMNICEF   300 mg, Oral, 2 Times Daily      magnesium oxide 400 MG tablet  Commonly known as: MAG-OX   400 mg, Oral, Daily      metoprolol tartrate 50 MG tablet  Commonly known as: LOPRESSOR   50 mg, Oral, Every 12 Hours Scheduled         Continue These Medications      Instructions Start Date   amitriptyline 25 MG tablet  Commonly known as: ELAVIL   25 mg, Oral, Nightly      aspirin 81 MG chewable tablet   81  mg, Oral, Daily      atorvastatin 20 MG tablet  Commonly known as: LIPITOR   20 mg, Oral, Daily      carbidopa-levodopa  MG per tablet  Commonly known as: SINEMET   1 tablet, Oral, 4 Times Daily      dexlansoprazole 60 MG capsule  Commonly known as: DEXILANT   60 mg, Oral, Daily      loratadine 10 MG tablet  Commonly known as: CLARITIN   10 mg, Oral, Daily      nitroglycerin 0.4 MG SL tablet  Commonly known as: NITROSTAT   0.4 mg, Sublingual, Every 5 Minutes PRN, Take no more than 3 doses in 15 minutes.       Omega 3 1000 MG capsule   1,000 mg, Oral, Daily      pramipexole 1 MG tablet  Commonly known as: MIRAPEX   0.5 mg, Oral, Nightly      sertraline 100 MG tablet  Commonly known as: ZOLOFT   100 mg, Oral, Daily         Stop These Medications    amLODIPine 5 MG tablet  Commonly known as: NORVASC     gabapentin 100 MG capsule  Commonly known as: NEURONTIN     metoprolol succinate XL 50 MG 24 hr tablet  Commonly known as: TOPROL-XL     omeprazole 20 MG capsule  Commonly known as: priLOSEC                  Additional Instructions for the Follow-ups that You Need to Schedule     Ambulatory Referral to Home Health   As directed      Face to Face Visit Date: 3/18/2023    Follow-up provider for Plan of Care?: I treated the patient in an acute care facility and will not continue treatment after discharge.    Follow-up provider: KREIS, SAMUEL DUANE [[id number]]    Reason/Clinical Findings: Patient with episodes of near syncope, history of sick sinus syndrome pacemaker placed, nonsustained V. tach, permission changes of medication, safety evaluation at home,    Describe mobility limitations that make leaving home difficult: Patient has episodes of near syncope, chest pain, sick sinus syndrome, needs company to leave the house    Nursing/Therapeutic Services Requested: Skilled Nursing Physical Therapy    Skilled nursing orders: Medication education    PT orders: Home safety assessment Gait Training Strengthening    Weight  Bearing Status: As Tolerated    Frequency: 1 Week 1         Discharge Follow-up with PCP   As directed       Currently Documented PCP:    Kreis, Samuel Duane, MD    PCP Phone Number:    [phone]     Follow Up Details: Oziel Santana MD (posthospitalization follow-up, BP check, electrolytes checked including magnesium)         Discharge Follow-up with Specified Provider: Cardiology; 2 Weeks   As directed      To: Cardiology    Follow Up: 2 Weeks    Follow Up Details: Sick sinus syndrome, NSVT            Follow-up Information     Kreis, Samuel Duane, MD .    Specialty: Family Medicine  Why: Oziel Santana MD (posthospitalization follow-up, BP check, electrolytes checked including magnesium)  Contact information:  [address] [place] [address] [address] [place] [address] [phone]                         Pending Labs     Order Current Status    Blood Culture - Blood, Arm, Left Preliminary result    Blood Culture - Blood, Hand, Right Preliminary result           The ASCVD Risk score (Leydi GURROLA, et al., 2019) failed to calculate for the following reasons:    Cannot find a previous HDL lab    Cannot find a previous total cholesterol lab     Clara Rashid MD  03/18/23  09:02 EDT    Please note that this discharge summary required more than 30 minutes to complete.      Electronically signed by Clara Rashid MD at 03/18/23 0924       Discharge Order (From admission, onward)     Start     Ordered    03/18/23 0851  Discharge patient  Once        Expected Discharge Date: 03/18/23    [place] [facility] [place]    Physician of Record for Attribution - Please select from Treatment Team: CLARA RASHID [[id number]]    Review needed by CMO to determine Physician of Record: No       Question Answer Comment   Physician of Record for Attribution - Please select from Treatment Team CLARA RASHID    Review needed by CMO to determine Physician of Record No        03/18/23 0856

## 2023-03-20 NOTE — CASE MANAGEMENT/SOCIAL WORK
Discharge Planning Assessment  RAYMUNDO John     Patient Name: Layne Rodriguez  MRN: [id number]  Today's Date: 3/20/2023    Admit Date: 3/14/2023          Discharge Plan     Row Name 03/20/23 0924       Plan    Final Discharge Disposition Code 06 - home with home health care    Final Note Pt was discharged home with [facility]. SS followed up with [facility] [phone] per Fun[facility]o who states they did not recieve order over the weekend. SS faxed orders to [phone]. SS contacted [facility] per Funmilayo for a second time who states they are out of network with pt's insurance. SS faxed referral to [facility] [phone]. SS contacte [facility] who state they are in network with pt's insurance and they will work pt up today.               TRE HajiW

## 2023-03-21 LAB — BACTERIA SPEC AEROBE CULT: NORMAL

## 2023-03-22 ENCOUNTER — READMISSION MANAGEMENT (OUTPATIENT)
Dept: CALL CENTER | Facility: HOSPITAL | Age: 78
End: 2023-03-22
Payer: MEDICARE

## 2023-03-22 NOTE — OUTREACH NOTE
Medical Week 1 Survey    Flowsheet Row Responses   [facility] facility patient discharged from? [facility]   Does the patient have one of the following disease processes/diagnoses(primary or secondary)? Other   Week 1 attempt successful? No   Unsuccessful attempts Attempt 1          Anne Horn Registered Nurse

## 2023-03-28 ENCOUNTER — READMISSION MANAGEMENT (OUTPATIENT)
Dept: CALL CENTER | Facility: HOSPITAL | Age: 78
End: 2023-03-28
Payer: MEDICARE

## 2023-03-28 NOTE — OUTREACH NOTE
Medical Week 2 Survey    Flowsheet Row Responses   Sabian[facility] facility patient discharged from? [facility]   Does the patient have one of the following disease processes/diagnoses(primary or secondary)? Other   Week 2 attempt successful? No   Unsuccessful attempts Attempt 1          Rosanna AGIULA - Registered Nurse

## 2023-03-30 ENCOUNTER — READMISSION MANAGEMENT (OUTPATIENT)
Dept: CALL CENTER | Facility: HOSPITAL | Age: 78
End: 2023-03-30
Payer: MEDICARE

## 2023-03-30 NOTE — OUTREACH NOTE
Medical Week 2 Survey    Flowsheet Row Responses   [facility] facility patient discharged from? [facility]   Does the patient have one of the following disease processes/diagnoses(primary or secondary)? Other   Week 2 attempt successful? No   Unsuccessful attempts Attempt 2   Revoke Decline to participate          Nayeli DE LA FUENTE - Registered Nurse

## 2023-04-03 ENCOUNTER — OFFICE VISIT (OUTPATIENT)
Dept: CARDIOLOGY | Facility: CLINIC | Age: 78
End: 2023-04-03
Payer: MEDICARE

## 2023-04-03 VITALS
WEIGHT: 146 LBS | SYSTOLIC BLOOD PRESSURE: 111 MMHG | HEART RATE: 61 BPM | OXYGEN SATURATION: 99 % | DIASTOLIC BLOOD PRESSURE: 67 MMHG | BODY MASS INDEX: 26.87 KG/M2 | HEIGHT: 62 IN

## 2023-04-03 DIAGNOSIS — I48.0 PAROXYSMAL ATRIAL FIBRILLATION: Primary | ICD-10-CM

## 2023-04-03 DIAGNOSIS — I49.5 TACHY-BRADY SYNDROME: ICD-10-CM

## 2023-04-03 DIAGNOSIS — R09.89 BRUIT OF LEFT CAROTID ARTERY: ICD-10-CM

## 2023-04-03 DIAGNOSIS — I25.10 ASCVD (ARTERIOSCLEROTIC CARDIOVASCULAR DISEASE): ICD-10-CM

## 2023-04-03 DIAGNOSIS — I47.29 NSVT (NONSUSTAINED VENTRICULAR TACHYCARDIA): ICD-10-CM

## 2023-04-03 DIAGNOSIS — E78.5 HYPERLIPIDEMIA LDL GOAL <70: ICD-10-CM

## 2023-04-03 PROCEDURE — 1159F MED LIST DOCD IN RCRD: CPT | Performed by: NURSE PRACTITIONER

## 2023-04-03 PROCEDURE — 3078F DIAST BP <80 MM HG: CPT | Performed by: NURSE PRACTITIONER

## 2023-04-03 PROCEDURE — 99214 OFFICE O/P EST MOD 30 MIN: CPT | Performed by: NURSE PRACTITIONER

## 2023-04-03 PROCEDURE — 3074F SYST BP LT 130 MM HG: CPT | Performed by: NURSE PRACTITIONER

## 2023-04-03 PROCEDURE — 1160F RVW MEDS BY RX/DR IN RCRD: CPT | Performed by: NURSE PRACTITIONER

## 2023-04-03 RX ORDER — METOPROLOL TARTRATE 50 MG/1
50 TABLET, FILM COATED ORAL EVERY 12 HOURS SCHEDULED
Qty: 60 TABLET | Refills: 3 | Status: SHIPPED | OUTPATIENT
Start: 2023-04-03

## 2023-04-03 RX ORDER — ATORVASTATIN CALCIUM 20 MG/1
20 TABLET, FILM COATED ORAL DAILY
Qty: 90 TABLET | Refills: 3 | Status: SHIPPED | OUTPATIENT
Start: 2023-04-03

## 2023-04-03 NOTE — PROGRESS NOTES
[facility]             Charity Douglas, APRN Kreis, Samuel Duane, MD  Layne Rodriguez  1945 04/03/2023    Patient Active Problem List   Diagnosis   • Bradycardia, sinus   • Dyspnea on exertion (NYHA class III)   • Sick sinus syndrome   • Cardiac pacemaker   • ASCVD (arteriosclerotic cardiovascular disease)   • Essential hypertension   • Tachy-shanon syndrome (CMS/HCC) s/p PPM implantation with generator change in December of 2019   • Near syncope   • Wide-complex tachycardia   • Asymmetric septal hypertrophy   • Chest pain, unspecified type   • NSVT (nonsustained ventricular tachycardia)       Dear Kreis, Samuel Duane, MD:    Subjective     Chief Complaint   Patient presents with   • Med Management     Verbal no med change.    • Chest Pain   • Shortness of Breath   • Palpitations   • Dizziness   • Edema       HPI:     This is a 77 y.o. female with known past medical history of essential hypertension, coronary artery disease status post PCI in the remote past, asymmetric septal hypertrophy without LVOT obstruction, pacemaker implantation in October 2009 by Dr. Tijerina, paroxysmal atrial fibrillation, Parkinson disease, Monae's esophagus, large hiatal hernia and NSVT.    Layne Rodriguez presents today for hospital follow-up.  Patient was recently in [facility] admitted for presyncope, chest pain, bigeminy with bradycardia, paroxysmal atrial fibrillation, nonsustained ventricular tachycardia.  She presented to the ED with complaints of substernal chest discomfort, palpitations and presyncope.  In the ED she was found to have PVCs with bigeminy.  CT scan of the brain was negative for CVA.  She was started back on her metoprolol for her PVCs.  Echocardiogram showed normal LV function.  Nuclear stress test was negative for ischemia.  Pacemaker was interrogated which showed nonsustained ventricular tachycardia with episodes of atrial fibrillation.  She was started  on Eliquis.  Chest x-ray showed large hiatal hernia.    Patient states since discharge from the hospital she continues to have intermittent episodes of diaphoresis and feeling like she is going to pass out shortly after she takes a few bites of food.  Denies any syncope, chest pain or shortness of breath.  Does report some fatigue and weakness.  Blood pressure controlled in the office today.    • Diagnostic Testing  1. Echocardiogram 3/2021: EF 66 to 70% with mild aortic valve regurgitation  2. Echocardiogram 3/2023: EF greater than 70% severe concentric hypertrophy  3. Nuclear stress test 3/2023: Small size infarct located in the lateral wall with no significant ischemia noted     All other systems were reviewed and were negative.    Patient Active Problem List   Diagnosis   • Bradycardia, sinus   • Dyspnea on exertion (NYHA class III)   • Sick sinus syndrome   • Cardiac pacemaker   • ASCVD (arteriosclerotic cardiovascular disease)   • Essential hypertension   • Tachy-shanon syndrome (CMS/HCC) s/p PPM implantation with generator change in December of 2019   • Near syncope   • Wide-complex tachycardia   • Asymmetric septal hypertrophy   • Chest pain, unspecified type   • NSVT (nonsustained ventricular tachycardia)       family history includes Heart disease in her mother; Hypertension in her brother, brother, brother, brother, sister, sister, sister, and sister; Stroke in her father, mother, and sister.     reports that she has never smoked. She has never used smokeless tobacco. She reports that she does not drink alcohol and does not use drugs.    Allergies   Allergen Reactions   • Codeine Other (See Comments)     headaches   • Reglan [Metoclopramide] Unknown (See Comments)     Eyes twitch/involuntary muscle movement         Current Outpatient Medications:   •  amitriptyline (ELAVIL) 25 MG tablet, Take 1 tablet by mouth Every Night., Disp: , Rfl:   •  apixaban (ELIQUIS) 5 MG tablet tablet, Take 1 tablet by mouth  Every 12 (Twelve) Hours. Indications: Atrial Fibrillation, Disp: 60 tablet, Rfl: 3  •  aspirin 81 MG chewable tablet, Chew 1 tablet Daily., Disp: , Rfl:   •  atorvastatin (LIPITOR) 20 MG tablet, Take 1 tablet by mouth Daily., Disp: 90 tablet, Rfl: 3  •  carbidopa-levodopa (SINEMET)  MG per tablet, Take 1 tablet by mouth 4 (Four) Times a Day., Disp: , Rfl:   •  cefdinir (OMNICEF) 300 MG capsule, Take 1 capsule by mouth 2 (Two) Times a Day., Disp: 6 capsule, Rfl: 0  •  dexlansoprazole (DEXILANT) 60 MG capsule, Take 1 capsule by mouth Daily., Disp: 30 capsule, Rfl: 0  •  loratadine (CLARITIN) 10 MG tablet, Take 1 tablet by mouth Daily., Disp: , Rfl:   •  Magnesium Oxide 400 (240 Mg) MG tablet, Take 1 tablet by mouth Daily., Disp: 30 tablet, Rfl: 3  •  metoprolol tartrate (LOPRESSOR) 50 MG tablet, Take 1 tablet by mouth Every 12 (Twelve) Hours., Disp: 60 tablet, Rfl: 3  •  nitroglycerin (NITROSTAT) 0.4 MG SL tablet, Place 1 tablet under the tongue Every 5 (Five) Minutes As Needed for Chest Pain. Take no more than 3 doses in 15 minutes., Disp: , Rfl:   •  Omega 3 1000 MG capsule, Take 1,000 mg by mouth Daily., Disp: , Rfl:   •  pramipexole (MIRAPEX) 1 MG tablet, Take 0.5 mg by mouth Every Night., Disp: , Rfl:   •  sertraline (ZOLOFT) 100 MG tablet, Take 1 tablet by mouth Daily., Disp: , Rfl:       Physical Exam:  I have reviewed the patient's current vital signs as documented in the patient's EMR.   Vitals:    04/03/23 1124   BP: 111/67   Pulse: 61   SpO2: 99%     Body mass index is 26.7 kg/m².       04/03/23  1124   Weight: 66.2 kg (146 lb)      Physical Exam  Constitutional:       General: She is not in acute distress.     Appearance: Normal appearance. She is well-developed and normal weight.   HENT:      Head: Normocephalic and atraumatic.   Eyes:      General: Lids are normal.      Conjunctiva/sclera: Conjunctivae normal.      Pupils: Pupils are equal, round, and reactive to light.   Neck:      Vascular: No  carotid bruit or JVD.   Cardiovascular:      Rate and Rhythm: Normal rate and regular rhythm.      Pulses: Normal pulses.      Heart sounds: Normal heart sounds, S1 normal and S2 normal. No murmur heard.  Pulmonary:      Effort: Pulmonary effort is normal. No respiratory distress.      Breath sounds: Normal breath sounds. No wheezing.   Abdominal:      General: Bowel sounds are normal. There is no distension.      Palpations: Abdomen is soft. There is no hepatomegaly or splenomegaly.      Tenderness: There is no abdominal tenderness.   Musculoskeletal:         General: No swelling. Normal range of motion.      Cervical back: Normal range of motion and neck supple.      Right lower leg: No edema.      Left lower leg: No edema.   Skin:     General: Skin is warm and dry.      Coloration: Skin is not jaundiced.      Findings: No rash.   Neurological:      General: No focal deficit present.      Mental Status: She is alert and oriented to person, place, and time. Mental status is at baseline.   Psychiatric:         Mood and Affect: Mood normal.         Speech: Speech normal.         Behavior: Behavior normal.         Thought Content: Thought content normal.         Judgment: Judgment normal.          DATA REVIEWED:     TTE/ANTONINO:  Results for orders placed during the hospital encounter of 03/14/23    Adult Transthoracic Echo Complete W/ Cont if Necessary Per Protocol    Interpretation Summary  •  Left ventricular systolic function is hyperdynamic (EF > 70%). Left ventricular ejection fraction appears to be greater than 70%, with almost closure of the left ventricular cavity know that the ventricular outflow tract gradient was done during the study, the study is also consistent with hypertrophic cardiomyopathy.  •  Left ventricular wall thickness is consistent with severe concentric hypertrophy.  •  Left ventricular diastolic function is consistent with (grade Ia w/high LAP) impaired relaxation.  •  Estimated right  ventricular systolic pressure from tricuspid regurgitation is normal (<35 mmHg).  •  Pacemaker leads was seen in the right atrium and right ventricle.      Laboratory evaluations:    Lab Results   Component Value Date    GLUCOSE 101 (H) 03/15/2023    BUN 23 03/15/2023    CREATININE 0.63 03/15/2023    EGFRIFNONA 73 11/20/2019    BCR 36.5 (H) 03/15/2023    K 3.8 03/15/2023    CO2 26.6 03/15/2023    CALCIUM 9.2 03/15/2023    ALBUMIN 3.8 03/14/2023    AST 15 03/14/2023    ALT <5 03/14/2023     Lab Results   Component Value Date    WBC 8.06 03/15/2023    HGB 10.5 (L) 03/15/2023    HCT 35.5 03/15/2023    MCV 79.1 03/15/2023     03/15/2023     No results found for: CHOL, CHLPL, TRIG, HDL, LDL, LDLDIRECT  Lab Results   Component Value Date    TSH 0.999 03/14/2023     No results found for: HGBA1C  Lab Results   Component Value Date    ALT <5 03/14/2023     No results found for: HGBA1C  Lab Results   Component Value Date    CREATININE 0.63 03/15/2023     No results found for: IRON, TIBC, FERRITIN  Lab Results   Component Value Date    INR 1.01 03/14/2023    PROTIME 13.5 03/14/2023           --------------------------------------------------------------------------------------------------------------------------    ASSESSMENT/PLAN:      Diagnosis Plan   1. Paroxysmal atrial fibrillation  apixaban (ELIQUIS) 5 MG tablet tablet    metoprolol tartrate (LOPRESSOR) 50 MG tablet      2. Hyperlipidemia LDL goal <70  atorvastatin (LIPITOR) 20 MG tablet    Lipid Panel      3. Bruit of left carotid artery  US Carotid Bilateral      4. NSVT (nonsustained ventricular tachycardia)        5. Tachy-shanon syndrome (CMS/HCC) s/p PPM implantation with generator change in December of 2019        6. ASCVD (arteriosclerotic cardiovascular disease)            1. Paroxysmal atrial fibrillation  2. NSVT  3. Pacemaker implantation  • Pacemaker interrogation recently showed episodes of nonsustained ventricular tachycardia and paroxysmal atrial  fibrillation.  Denies any syncope.  Continue with Eliquis for stroke prevention and metoprolol for rate control.  Currently normal sinus rhythm today.  • Continue with routine pacemaker interrogations.  • Patient reports episodes of diaphoresis and presyncope shortly after eating.  Did recommend follow-up with her PCP and possible referral to GI with the symptoms along with her large hiatal hernia found on chest x-ray.    4. Hyperlipidemia  • No recent lipid panel on file.  Obtain lipid panel and adjust statin as needed.    5.  ASCVD  · Denies any chest pain.  Nuclear stress test recently negative for ischemia.  Echocardiogram showed normal LV function.  Continue with aspirin, Lipitor and metoprolol.    6.  Carotid bruit  · Obtain carotid ultrasound to evaluate further.      This document has been @Electronically signed by JOSUÉ Alaniz, 04/03/23, 10:57 AM EDT.       Dictated Utilizing Dragon Dictation: Part of this note may be an electronic transcription/translation of spoken language to printed text using the Dragon Dictation System.    Follow-up appointment and medication changes provided in hand delivered After Visit Summary as well as reviewed in the room.

## 2023-08-07 ENCOUNTER — TELEPHONE (OUTPATIENT)
Dept: CARDIOLOGY | Facility: CLINIC | Age: 78
End: 2023-08-07
Payer: MEDICARE

## 2023-08-23 ENCOUNTER — HOSPITAL ENCOUNTER (OUTPATIENT)
Dept: CARDIOLOGY | Facility: HOSPITAL | Age: 78
Discharge: HOME | End: 2023-08-23
Payer: MEDICARE

## 2023-08-23 ENCOUNTER — LAB (OUTPATIENT)
Dept: LAB | Facility: HOSPITAL | Age: 78
End: 2023-08-23
Payer: MEDICARE

## 2023-08-23 DIAGNOSIS — E78.5 HYPERLIPIDEMIA LDL GOAL <70: ICD-10-CM

## 2023-08-23 DIAGNOSIS — R09.89 BRUIT OF LEFT CAROTID ARTERY: ICD-10-CM

## 2023-08-23 LAB
CHOLEST SERPL-MCNC: 122 MG/DL (ref 0–200)
HDLC SERPL-MCNC: 38 MG/DL (ref 40–60)
LDLC SERPL CALC-MCNC: 62 MG/DL (ref 0–100)
LDLC/HDLC SERPL: 1.56 {RATIO}
TRIGL SERPL-MCNC: 124 MG/DL (ref 0–150)
VLDLC SERPL-MCNC: 22 MG/DL (ref 5–40)

## 2023-08-23 PROCEDURE — 80061 LIPID PANEL: CPT

## 2023-08-23 PROCEDURE — 93880 EXTRACRANIAL BILAT STUDY: CPT

## 2023-08-23 PROCEDURE — 36415 COLL VENOUS BLD VENIPUNCTURE: CPT

## 2023-08-30 ENCOUNTER — OFFICE VISIT (OUTPATIENT)
Dept: CARDIOLOGY | Facility: CLINIC | Age: 78
End: 2023-08-30
Payer: MEDICARE

## 2023-08-30 VITALS
OXYGEN SATURATION: 95 % | HEART RATE: 91 BPM | WEIGHT: 146.6 LBS | DIASTOLIC BLOOD PRESSURE: 67 MMHG | BODY MASS INDEX: 26.98 KG/M2 | HEIGHT: 62 IN | SYSTOLIC BLOOD PRESSURE: 126 MMHG

## 2023-08-30 DIAGNOSIS — I47.29 NSVT (NONSUSTAINED VENTRICULAR TACHYCARDIA): ICD-10-CM

## 2023-08-30 DIAGNOSIS — I25.10 ASCVD (ARTERIOSCLEROTIC CARDIOVASCULAR DISEASE): ICD-10-CM

## 2023-08-30 DIAGNOSIS — I48.0 PAROXYSMAL ATRIAL FIBRILLATION: ICD-10-CM

## 2023-08-30 DIAGNOSIS — Z95.0 CARDIAC PACEMAKER: Primary | ICD-10-CM

## 2023-08-30 RX ORDER — DULOXETIN HYDROCHLORIDE 60 MG/1
CAPSULE, DELAYED RELEASE ORAL
COMMUNITY
Start: 2023-08-24

## 2023-08-30 RX ORDER — CLONAZEPAM 0.5 MG/1
0.5 TABLET ORAL
COMMUNITY
Start: 2023-07-27 | End: 2024-08-26

## 2023-08-30 RX ORDER — AMLODIPINE BESYLATE 5 MG/1
TABLET ORAL
COMMUNITY
Start: 2023-08-24

## 2023-08-30 NOTE — PROGRESS NOTES
[facility]             [facility] Douglas, APRN Kreis, Samuel Duane, MD  Layne Rodriguez  1945 08/30/2023    Patient Active Problem List   Diagnosis    Bradycardia, sinus    Dyspnea on exertion (NYHA class III)    Sick sinus syndrome    Cardiac pacemaker    ASCVD (arteriosclerotic cardiovascular disease)    Essential hypertension    Tachy-shanon syndrome    Near syncope    Wide-complex tachycardia    Asymmetric septal hypertrophy    Chest pain, unspecified type    NSVT (nonsustained ventricular tachycardia)    Paroxysmal atrial fibrillation       Dear Kreis, Samuel Duane, MD:    Subjective     Chief Complaint   Patient presents with    Follow-up       HPI:     This is a 78 y.o. female with known past medical history of essential hypertension, coronary artery disease status post PCI in the remote past, asymmetric septal hypertrophy without LVOT obstruction, pacemaker implantation in October 2009 by Dr. Tijerina, paroxysmal atrial fibrillation, Parkinson disease, Monae's esophagus, large hiatal hernia and NSVT.     Layne Rodriguez presents today for routine cardiology follow up.  Patient states she has been having some recurrent falls over the last few months due to her Parkinson's disease and loss of balance.  Does state that she fell couple days ago and hit the back of her head but did not seek emergent care.  Blood pressure has been well controlled.  Denies any syncope or dizziness, chest pain or shortness of breath.  Carotid ultrasound was negative for stenosis.  Reports compliance with medications.  Recently panel showed LDL at goal.  States she is in constant pain in her legs and is following up with neurology tomorrow.    Diagnostic Testing  Echocardiogram 3/2021: EF 66 to 70% with mild aortic valve regurgitation  Echocardiogram 3/2023: EF greater than 70% severe concentric hypertrophy  Nuclear stress test 3/2023: Small size infarct located in the lateral wall with  no significant ischemia noted  Carotid ultrasound 8/2023: Negative     All other systems were reviewed and were negative.    Patient Active Problem List   Diagnosis    Bradycardia, sinus    Dyspnea on exertion (NYHA class III)    Sick sinus syndrome    Cardiac pacemaker    ASCVD (arteriosclerotic cardiovascular disease)    Essential hypertension    Tachy-shanon syndrome    Near syncope    Wide-complex tachycardia    Asymmetric septal hypertrophy    Chest pain, unspecified type    NSVT (nonsustained ventricular tachycardia)    Paroxysmal atrial fibrillation       family history includes Heart disease in her mother; Hypertension in her brother, brother, brother, brother, sister, sister, sister, and sister; Stroke in her father, mother, and sister.     reports that she has never smoked. She has never used smokeless tobacco. She reports that she does not drink alcohol and does not use drugs.    Allergies   Allergen Reactions    Codeine Other (See Comments)     headaches    Reglan [Metoclopramide] Unknown (See Comments)     Eyes twitch/involuntary muscle movement         Current Outpatient Medications:     amitriptyline (ELAVIL) 25 MG tablet, Take 1 tablet by mouth Every Night., Disp: , Rfl:     amLODIPine (NORVASC) 5 MG tablet, , Disp: , Rfl:     aspirin 81 MG chewable tablet, Chew 1 tablet Daily., Disp: , Rfl:     atorvastatin (LIPITOR) 20 MG tablet, Take 1 tablet by mouth Daily., Disp: 90 tablet, Rfl: 3    carbidopa-levodopa (SINEMET)  MG per tablet, Take 1 tablet by mouth 4 (Four) Times a Day., Disp: , Rfl:     clonazePAM (KlonoPIN) 0.5 MG tablet, Take 1 tablet by mouth., Disp: , Rfl:     dexlansoprazole (DEXILANT) 60 MG capsule, Take 1 capsule by mouth Daily., Disp: 30 capsule, Rfl: 0    DULoxetine (CYMBALTA) 60 MG capsule, , Disp: , Rfl:     Eliquis 5 MG tablet tablet, TAKE 1 TABLET BY MOUTH EVERY 12 (TWELVE) HOURS. INDICATIONS: ATRIAL FIBRILLATION, Disp: 60 tablet, Rfl: 3    metoprolol tartrate (LOPRESSOR) 50  MG tablet, Take 1 tablet by mouth Every 12 (Twelve) Hours., Disp: 60 tablet, Rfl: 3    nitroglycerin (NITROSTAT) 0.4 MG SL tablet, Place 1 tablet under the tongue Every 5 (Five) Minutes As Needed for Chest Pain. Take no more than 3 doses in 15 minutes., Disp: , Rfl:     Omega 3 1000 MG capsule, Take 1,000 mg by mouth Daily., Disp: , Rfl:     sertraline (ZOLOFT) 100 MG tablet, Take 1 tablet by mouth Daily., Disp: , Rfl:       Physical Exam:  I have reviewed the patient's current vital signs as documented in the patient's EMR.   Vitals:    08/30/23 1354   BP: 126/67   Pulse: 91   SpO2: 95%     Body mass index is 26.81 kg/mý.       08/30/23  1354   Weight: 66.5 kg (146 lb 9.6 oz)      Physical Exam  Constitutional:       General: She is not in acute distress.     Appearance: Normal appearance. She is well-developed and normal weight.   HENT:      Head: Normocephalic and atraumatic.   Eyes:      General: Lids are normal.      Conjunctiva/sclera: Conjunctivae normal.      Pupils: Pupils are equal, round, and reactive to light.   Neck:      Vascular: No carotid bruit or JVD.   Cardiovascular:      Rate and Rhythm: Normal rate and regular rhythm.      Pulses: Normal pulses.      Heart sounds: Normal heart sounds, S1 normal and S2 normal. No murmur heard.  Pulmonary:      Effort: Pulmonary effort is normal. No respiratory distress.      Breath sounds: Normal breath sounds. No wheezing.   Abdominal:      General: Bowel sounds are normal. There is no distension.      Palpations: Abdomen is soft. There is no hepatomegaly or splenomegaly.      Tenderness: There is no abdominal tenderness.   Musculoskeletal:         General: No swelling. Normal range of motion.      Cervical back: Normal range of motion and neck supple.      Right lower leg: No edema.      Left lower leg: No edema.   Skin:     General: Skin is warm and dry.      Coloration: Skin is not jaundiced.      Findings: No rash.   Neurological:      General: No focal  deficit present.      Mental Status: She is alert and oriented to person, place, and time. Mental status is at baseline.   Psychiatric:         Mood and Affect: Mood normal.         Speech: Speech normal.         Behavior: Behavior normal.         Thought Content: Thought content normal.         Judgment: Judgment normal.          DATA REVIEWED:     TTE/ANTONINO:  Results for orders placed during the hospital encounter of 03/14/23    Adult Transthoracic Echo Complete W/ Cont if Necessary Per Protocol    Interpretation Summary    Left ventricular systolic function is hyperdynamic (EF > 70%). Left ventricular ejection fraction appears to be greater than 70%, with almost closure of the left ventricular cavity know that the ventricular outflow tract gradient was done during the study, the study is also consistent with hypertrophic cardiomyopathy.    Left ventricular wall thickness is consistent with severe concentric hypertrophy.    Left ventricular diastolic function is consistent with (grade Ia w/high LAP) impaired relaxation.    Estimated right ventricular systolic pressure from tricuspid regurgitation is normal (<35 mmHg).    Pacemaker leads was seen in the right atrium and right ventricle.      Laboratory evaluations:    Lab Results   Component Value Date    GLUCOSE 101 (H) 03/15/2023    BUN 23 03/15/2023    CREATININE 0.63 03/15/2023    EGFRIFNONA 73 11/20/2019    BCR 36.5 (H) 03/15/2023    K 3.8 03/15/2023    CO2 26.6 03/15/2023    CALCIUM 9.2 03/15/2023    ALBUMIN 3.8 03/14/2023    AST 15 03/14/2023    ALT <5 03/14/2023     Lab Results   Component Value Date    WBC 8.06 03/15/2023    HGB 10.5 (L) 03/15/2023    HCT 35.5 03/15/2023    MCV 79.1 03/15/2023     03/15/2023     Lab Results   Component Value Date    CHOL 122 08/23/2023    TRIG 124 08/23/2023    HDL 38 (L) 08/23/2023    LDL 62 08/23/2023     Lab Results   Component Value Date    TSH 0.999 03/14/2023     No results found for: HGBA1C  Lab Results    Component Value Date    ALT <5 03/14/2023     No results found for: HGBA1C  Lab Results   Component Value Date    CREATININE 0.63 03/15/2023     No results found for: IRON, TIBC, FERRITIN  Lab Results   Component Value Date    INR 1.01 03/14/2023    PROTIME 13.5 03/14/2023           --------------------------------------------------------------------------------------------------------------------------    ASSESSMENT/PLAN:      Diagnosis Plan   1. Cardiac pacemaker        2. ASCVD (arteriosclerotic cardiovascular disease)        3. NSVT (nonsustained ventricular tachycardia)        4. Paroxysmal atrial fibrillation            Paroxysmal atrial fibrillation  Pacemaker implantation  No recent pacemaker interrogation on file therefore we will set her up for next pacemaker clinic.  Currently normal sinus rhythm.  Patient does report that she has had some recurrent falls due to her Parkinson's and loss of balance.  Does state she had a fall few days ago for which she hit her head but did not seek emergent care.  I did recommend her to seek emergent care for symptoms as she states she is not feeling well and given she is on eliquis she is at increased risk for brain bleed. She needs further imaging of head.  She states she will think about going to ER although I highly recommended it. She is following up with neurology tomorrow and states she will request that they do a head CT. I also advised her to use a walker or cane when ambulating.     Hyperlipidemia  Recent lipid panel showed LDL at goal.  Continue statin.    4.  ASCVD  Denies any chest pain.  Nuclear stress test recently negative for ischemia.  Echocardiogram showed normal LV function.  Continue with aspirin, Lipitor metoprolol.    This document has been @Electronically signed by JOSUÉ Alaniz, 08/30/23, 2:04 PM EDT.       Dictated Utilizing Dragon Dictation: Part of this note may be an electronic transcription/translation of spoken language to printed  text using the Dragon Dictation System.    Follow-up appointment and medication changes provided in hand delivered After Visit Summary as well as reviewed in the room.

## 2023-09-07 DIAGNOSIS — I48.0 PAROXYSMAL ATRIAL FIBRILLATION: ICD-10-CM

## 2023-09-07 RX ORDER — LANOLIN ALCOHOL/MO/W.PET/CERES
CREAM (GRAM) TOPICAL
Qty: 90 TABLET | Refills: 0 | OUTPATIENT
Start: 2023-09-07

## 2023-09-07 RX ORDER — METOPROLOL TARTRATE 50 MG/1
50 TABLET, FILM COATED ORAL EVERY 12 HOURS SCHEDULED
Qty: 60 TABLET | Refills: 3 | Status: SHIPPED | OUTPATIENT
Start: 2023-09-07

## 2024-01-22 DIAGNOSIS — I48.0 PAROXYSMAL ATRIAL FIBRILLATION: ICD-10-CM

## 2024-01-22 DIAGNOSIS — E78.5 HYPERLIPIDEMIA LDL GOAL <70: ICD-10-CM

## 2024-01-22 RX ORDER — ATORVASTATIN CALCIUM 20 MG/1
20 TABLET, FILM COATED ORAL DAILY
Qty: 90 TABLET | Refills: 0 | Status: SHIPPED | OUTPATIENT
Start: 2024-01-22

## 2024-01-22 RX ORDER — METOPROLOL TARTRATE 50 MG/1
50 TABLET, FILM COATED ORAL EVERY 12 HOURS SCHEDULED
Qty: 180 TABLET | Refills: 0 | Status: SHIPPED | OUTPATIENT
Start: 2024-01-22

## 2024-01-22 NOTE — TELEPHONE ENCOUNTER
[facility] ion [place] metoprolol tartrate 50 mg eliquis 5 mg and Lipitor 20 mg fax # [phone]

## 2024-02-20 ENCOUNTER — TELEPHONE (OUTPATIENT)
Dept: CARDIOLOGY | Facility: CLINIC | Age: 79
End: 2024-02-20
Payer: MEDICARE

## 2024-02-20 NOTE — TELEPHONE ENCOUNTER
Caller: Layne Rodriguez    Relationship: Self    Best call back number: [phone]     What is the medical concern/diagnosis: PACEMAKER PRESENCE, CHF    What specialty or service is being requested: CARDIOLOGY    What is the provider, practice or medical service name: [facility]    What is the office location: [facility]

## 2024-03-01 NOTE — TELEPHONE ENCOUNTER
Patient having issues with transportation but was given contact info for [facility]. She will stay on pacer interr schedule for now. If transportation situation resolves, will call for appt with provider. She does not wish to be referred to [facility] physician.

## 2024-04-09 ENCOUNTER — APPOINTMENT (OUTPATIENT)
Dept: CT IMAGING | Facility: HOSPITAL | Age: 79
End: 2024-04-09
Payer: MEDICARE

## 2024-04-09 ENCOUNTER — APPOINTMENT (OUTPATIENT)
Dept: GENERAL RADIOLOGY | Facility: HOSPITAL | Age: 79
End: 2024-04-09
Payer: MEDICARE

## 2024-04-09 ENCOUNTER — HOSPITAL ENCOUNTER (EMERGENCY)
Facility: HOSPITAL | Age: 79
Discharge: HOME | End: 2024-04-09
Attending: STUDENT IN AN ORGANIZED HEALTH CARE EDUCATION/TRAINING PROGRAM | Admitting: STUDENT IN AN ORGANIZED HEALTH CARE EDUCATION/TRAINING PROGRAM
Payer: MEDICARE

## 2024-04-09 VITALS
DIASTOLIC BLOOD PRESSURE: 82 MMHG | SYSTOLIC BLOOD PRESSURE: 150 MMHG | BODY MASS INDEX: 27.05 KG/M2 | HEIGHT: 62 IN | HEART RATE: 60 BPM | OXYGEN SATURATION: 93 % | WEIGHT: 147 LBS | TEMPERATURE: 98.2 F | RESPIRATION RATE: 20 BRPM
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
## 2024-04-09 DIAGNOSIS — S22.050A COMPRESSION FRACTURE OF T6 VERTEBRA, INITIAL ENCOUNTER: Primary | ICD-10-CM

## 2024-04-09 LAB
ALBUMIN SERPL-MCNC: 4 G/DL (ref 3.5–5.2)
ALBUMIN/GLOB SERPL: 1.3 G/DL
ALP SERPL-CCNC: 79 U/L (ref 39–117)
ALT SERPL W P-5'-P-CCNC: 5 U/L (ref 1–33)
ANION GAP SERPL CALCULATED.3IONS-SCNC: 8.4 MMOL/L (ref 5–15)
AST SERPL-CCNC: 16 U/L (ref 1–32)
BASOPHILS # BLD AUTO: 0.07 10*3/MM3 (ref 0–0.2)
BASOPHILS NFR BLD AUTO: 0.8 % (ref 0–1.5)
BILIRUB SERPL-MCNC: 0.2 MG/DL (ref 0–1.2)
BUN SERPL-MCNC: 21 MG/DL (ref 8–23)
BUN/CREAT SERPL: 25 (ref 7–25)
CALCIUM SPEC-SCNC: 9.6 MG/DL (ref 8.6–10.5)
CHLORIDE SERPL-SCNC: 100 MMOL/L (ref 98–107)
CO2 SERPL-SCNC: 27.6 MMOL/L (ref 22–29)
CREAT SERPL-MCNC: 0.84 MG/DL (ref 0.57–1)
DEPRECATED RDW RBC AUTO: 43.4 FL (ref 37–54)
EGFRCR SERPLBLD CKD-EPI 2021: 71.7 ML/MIN/1.73
EOSINOPHIL # BLD AUTO: 0.25 10*3/MM3 (ref 0–0.4)
EOSINOPHIL NFR BLD AUTO: 3 % (ref 0.3–6.2)
ERYTHROCYTE [DISTWIDTH] IN BLOOD BY AUTOMATED COUNT: 14.1 % (ref 12.3–15.4)
GLOBULIN UR ELPH-MCNC: 3.2 GM/DL
GLUCOSE SERPL-MCNC: 131 MG/DL (ref 65–99)
HCT VFR BLD AUTO: 39.5 % (ref 34–46.6)
HGB BLD-MCNC: 12.2 G/DL (ref 12–15.9)
HOLD SPECIMEN: NORMAL
HOLD SPECIMEN: NORMAL
IMM GRANULOCYTES # BLD AUTO: 0.02 10*3/MM3 (ref 0–0.05)
IMM GRANULOCYTES NFR BLD AUTO: 0.2 % (ref 0–0.5)
LYMPHOCYTES # BLD AUTO: 2.52 10*3/MM3 (ref 0.7–3.1)
LYMPHOCYTES NFR BLD AUTO: 29.8 % (ref 19.6–45.3)
MCH RBC QN AUTO: 26.1 PG (ref 26.6–33)
MCHC RBC AUTO-ENTMCNC: 30.9 G/DL (ref 31.5–35.7)
MCV RBC AUTO: 84.4 FL (ref 79–97)
MONOCYTES # BLD AUTO: 0.76 10*3/MM3 (ref 0.1–0.9)
MONOCYTES NFR BLD AUTO: 9 % (ref 5–12)
NEUTROPHILS NFR BLD AUTO: 4.84 10*3/MM3 (ref 1.7–7)
NEUTROPHILS NFR BLD AUTO: 57.2 % (ref 42.7–76)
NRBC BLD AUTO-RTO: 0 /100 WBC (ref 0–0.2)
PLATELET # BLD AUTO: 261 10*3/MM3 (ref 140–450)
PMV BLD AUTO: 9.6 FL (ref 6–12)
POTASSIUM SERPL-SCNC: 4.5 MMOL/L (ref 3.5–5.2)
PROT SERPL-MCNC: 7.2 G/DL (ref 6–8.5)
RBC # BLD AUTO: 4.68 10*6/MM3 (ref 3.77–5.28)
SODIUM SERPL-SCNC: 136 MMOL/L (ref 136–145)
TROPONIN T SERPL HS-MCNC: 14 NG/L
WBC NRBC COR # BLD AUTO: 8.46 10*3/MM3 (ref 3.4–10.8)
WHOLE BLOOD HOLD COAG: NORMAL
WHOLE BLOOD HOLD SPECIMEN: NORMAL

## 2024-04-09 PROCEDURE — 93005 ELECTROCARDIOGRAM TRACING: CPT | Performed by: STUDENT IN AN ORGANIZED HEALTH CARE EDUCATION/TRAINING PROGRAM

## 2024-04-09 PROCEDURE — 71045 X-RAY EXAM CHEST 1 VIEW: CPT

## 2024-04-09 PROCEDURE — 70450 CT HEAD/BRAIN W/O DYE: CPT | Performed by: RADIOLOGY

## 2024-04-09 PROCEDURE — 72125 CT NECK SPINE W/O DYE: CPT | Performed by: RADIOLOGY

## 2024-04-09 PROCEDURE — 99284 EMERGENCY DEPT VISIT MOD MDM: CPT

## 2024-04-09 PROCEDURE — 93010 ELECTROCARDIOGRAM REPORT: CPT | Performed by: INTERNAL MEDICINE

## 2024-04-09 PROCEDURE — 85025 COMPLETE CBC W/AUTO DIFF WBC: CPT | Performed by: STUDENT IN AN ORGANIZED HEALTH CARE EDUCATION/TRAINING PROGRAM

## 2024-04-09 PROCEDURE — 84484 ASSAY OF TROPONIN QUANT: CPT | Performed by: STUDENT IN AN ORGANIZED HEALTH CARE EDUCATION/TRAINING PROGRAM

## 2024-04-09 PROCEDURE — 71250 CT THORAX DX C-: CPT

## 2024-04-09 PROCEDURE — 73030 X-RAY EXAM OF SHOULDER: CPT

## 2024-04-09 PROCEDURE — 73030 X-RAY EXAM OF SHOULDER: CPT | Performed by: RADIOLOGY

## 2024-04-09 PROCEDURE — 71045 X-RAY EXAM CHEST 1 VIEW: CPT | Performed by: RADIOLOGY

## 2024-04-09 PROCEDURE — 80053 COMPREHEN METABOLIC PANEL: CPT | Performed by: STUDENT IN AN ORGANIZED HEALTH CARE EDUCATION/TRAINING PROGRAM

## 2024-04-09 PROCEDURE — 71250 CT THORAX DX C-: CPT | Performed by: RADIOLOGY

## 2024-04-09 PROCEDURE — 70450 CT HEAD/BRAIN W/O DYE: CPT

## 2024-04-09 PROCEDURE — 36415 COLL VENOUS BLD VENIPUNCTURE: CPT

## 2024-04-09 PROCEDURE — 72125 CT NECK SPINE W/O DYE: CPT

## 2024-04-09 RX ORDER — SODIUM CHLORIDE 0.9 % (FLUSH) 0.9 %
10 SYRINGE (ML) INJECTION AS NEEDED
Status: DISCONTINUED | OUTPATIENT
Start: 2024-04-09 | End: 2024-04-09 | Stop reason: HOSPADM

## 2024-04-09 RX ORDER — ASPIRIN 325 MG
325 TABLET ORAL ONCE
Status: COMPLETED | OUTPATIENT
Start: 2024-04-09 | End: 2024-04-09

## 2024-04-09 RX ORDER — HYDROCODONE BITARTRATE AND ACETAMINOPHEN 5; 325 MG/1; MG/1
1 TABLET ORAL ONCE
Status: COMPLETED | OUTPATIENT
Start: 2024-04-09 | End: 2024-04-09

## 2024-04-09 RX ADMIN — ASPIRIN 325 MG: 325 TABLET ORAL at 17:22

## 2024-04-09 RX ADMIN — HYDROCODONE BITARTRATE AND ACETAMINOPHEN 1 TABLET: 5; 325 TABLET ORAL at 17:23

## 2024-04-09 NOTE — ED PROVIDER NOTES
Subjective   History of Present Illness  This is a 78 year old female patient who presents to the ER with chief complaint of fall. PMH significant for Parkinson's Disease, HLD, HTN, CAD, COPD not requiring oxygen. Yesterday, the patient was walking down her steps when her dog got between her and her grandchild, causing her to fall. She hit her head and the left side of her ribs as well as her left shoulder. She denies syncope and LOC. The pain was worsened today so she decided to come in to be evaluated.     History provided by:  Patient and relative   used: No      Review of Systems   Constitutional: Negative.  Negative for fever.   Respiratory: Negative.     Cardiovascular: Negative.  Negative for chest pain.   Gastrointestinal: Negative.  Negative for abdominal pain.   Endocrine: Negative.    Genitourinary: Negative.  Negative for dysuria.   Musculoskeletal:  Positive for neck pain. Negative for arthralgias, back pain, gait problem, joint swelling, myalgias and neck stiffness.   Skin: Negative.    Neurological:  Positive for headaches. Negative for dizziness, tremors, seizures, syncope, facial asymmetry, speech difficulty, weakness, light-headedness and numbness.   Psychiatric/Behavioral: Negative.     All other systems reviewed and are negative.      Past Medical History:   Diagnosis Date    Anemia     Arthritis     ASCVD (arteriosclerotic cardiovascular disease)     Monae's esophagus     COPD (chronic obstructive pulmonary disease)     Coronary artery disease     Dyslipidemia     Essential hypertension     GERD (gastroesophageal reflux disease)     Myocardial infarction     Osteoporosis        Allergies   Allergen Reactions    Codeine Other (See Comments)     headaches    Reglan [Metoclopramide] Unknown (See Comments)     Eyes twitch/involuntary muscle movement       Past Surgical History:   Procedure Laterality Date    BACK SURGERY      CARDIAC ELECTROPHYSIOLOGY PROCEDURE N/A 12/3/2019     Procedure: PPM battery change;  Surgeon: Flako Pavon MD;  Location: [facility] CATH INVASIVE LOCATION;  Service: Cardiology    CORONARY STENT PLACEMENT      x 3    HYSTERECTOMY      INSERT / REPLACE / REMOVE PACEMAKER      PACEMAKER IMPLANTATION         Family History   Problem Relation Age of Onset    Stroke Mother     Heart disease Mother     Stroke Father     Hypertension Brother     Hypertension Sister     Stroke Sister     Hypertension Sister     Hypertension Sister     Hypertension Sister     Hypertension Brother     Hypertension Brother     Hypertension Brother        Social History     Socioeconomic History    Marital status:     Number of children: 5   Tobacco Use    Smoking status: Never    Smokeless tobacco: Never   Vaping Use    Vaping status: Never Used   Substance and Sexual Activity    Alcohol use: No    Drug use: No    Sexual activity: Defer           Objective   Physical Exam  Vitals and nursing note reviewed.   Constitutional:       General: She is not in acute distress.     Appearance: She is well-developed. She is not diaphoretic.   HENT:      Head: Normocephalic and atraumatic.      Right Ear: External ear normal.      Left Ear: External ear normal.      Nose: Nose normal.   Eyes:      Conjunctiva/sclera: Conjunctivae normal.      Pupils: Pupils are equal, round, and reactive to light.   Neck:      Vascular: No JVD.      Trachea: No tracheal deviation.   Cardiovascular:      Rate and Rhythm: Normal rate and regular rhythm.      Heart sounds: Normal heart sounds. No murmur heard.  Pulmonary:      Effort: Pulmonary effort is normal. No respiratory distress.      Breath sounds: Normal breath sounds. No wheezing.   Abdominal:      General: Bowel sounds are normal.      Palpations: Abdomen is soft.      Tenderness: There is no abdominal tenderness.   Musculoskeletal:         General: Tenderness and signs of injury present. No swelling or deformity.      Cervical back: Normal range of motion  and neck supple.      Comments: Left shoulder, left sided ribs and neck skin intact with no bruising, abrasion or edema; tenderness to palpation noted; limited, painful ROM in left arm; neurovascular status and sensation BUE and BLE intact.    Skin:     General: Skin is warm and dry.      Coloration: Skin is not pale.      Findings: No erythema or rash.   Neurological:      Mental Status: She is alert and oriented to person, place, and time.      Cranial Nerves: No cranial nerve deficit.   Psychiatric:         Behavior: Behavior normal.         Thought Content: Thought content normal.         Procedures           ED Course  ED Course as of 04/09/24 2102 Tue Apr 09, 2024   1620 EKGrhythm.  61 bpm.  No acute ST elevation.  QTc 402.  Electronically signed by Wellington Triana DO, 04/09/24, 4:20 PM EDT.   [SF]   1653 CT Head Without Contrast  IMPRESSION:    Unremarkable exam demonstrating no CT evidence of acute intracranial  findings.     This report was finalized on 4/9/2024 4:42 PM by Dr. Rodger Burr MD   [MM]   1716 CT Chest Without Contrast Diagnostic  IMPRESSION:     1. Compression of T6 does not appear to be hyperacute  2. Parenchymal nodules in the right lung as above                 This report was finalized on 4/9/2024 5:12 PM by Dr. Rodger Burr MD   [MM]   1732 Signed out to Kristina Rich NP  [MM]   1743 CT Cervical Spine Without Contrast  IMPRESSION:     1. No acute bony abnormality.     2. Arthritic change     This report was finalized on 4/9/2024 5:39 PM by Dr. Rodger Burr MD   [MM]   1746 XR Shoulder 2+ View Left  IMPRESSION:    No acute findings in the left shoulder.        This report was finalized on 4/9/2024 5:41 PM by Dr. Rodger Burr MD   [MM]      ED Course User Index  [MM] Padmini Joseph PA  [SF] Wellington Triana DO      XR Shoulder 2+ View Left    Result Date: 4/9/2024    No acute findings in the left shoulder.   This report was finalized on 4/9/2024 5:41 PM by Dr. Rodger Burr MD.       XR Chest 1 View    Result Date: 4/9/2024  EXAM either mislabeled or the patient has dextrocardia.    This report was finalized on 4/9/2024 5:40 PM by Dr. Rodger Burr MD.      CT Cervical Spine Without Contrast    Result Date: 4/9/2024   1. No acute bony abnormality.  2. Arthritic change  This report was finalized on 4/9/2024 5:39 PM by Dr. Rodger Burr MD.      CT Chest Without Contrast Diagnostic    Result Date: 4/9/2024   1. Compression of T6 does not appear to be hyperacute 2. Parenchymal nodules in the right lung as above      This report was finalized on 4/9/2024 5:12 PM by Dr. Rodger Burr MD.      CT Head Without Contrast    Result Date: 4/9/2024    Unremarkable exam demonstrating no CT evidence of acute intracranial findings.  This report was finalized on 4/9/2024 4:42 PM by Dr. Rodger Burr MD.                                 Results for orders placed or performed during the hospital encounter of 04/09/24   Comprehensive Metabolic Panel    Specimen: Blood   Result Value Ref Range    Glucose 131 (H) 65 - 99 mg/dL    BUN 21 8 - 23 mg/dL    Creatinine 0.84 0.57 - 1.00 mg/dL    Sodium 136 136 - 145 mmol/L    Potassium 4.5 3.5 - 5.2 mmol/L    Chloride 100 98 - 107 mmol/L    CO2 27.6 22.0 - 29.0 mmol/L    Calcium 9.6 8.6 - 10.5 mg/dL    Total Protein 7.2 6.0 - 8.5 g/dL    Albumin 4.0 3.5 - 5.2 g/dL    ALT (SGPT) 5 1 - 33 U/L    AST (SGOT) 16 1 - 32 U/L    Alkaline Phosphatase 79 39 - 117 U/L    Total Bilirubin 0.2 0.0 - 1.2 mg/dL    Globulin 3.2 gm/dL    A/G Ratio 1.3 g/dL    BUN/Creatinine Ratio 25.0 7.0 - 25.0    Anion Gap 8.4 5.0 - 15.0 mmol/L    eGFR 71.7 >60.0 mL/min/1.73   High Sensitivity Troponin T    Specimen: Blood   Result Value Ref Range    HS Troponin T 14 (H) <14 ng/L   CBC Auto Differential    Specimen: Blood   Result Value Ref Range    WBC 8.46 3.40 - 10.80 10*3/mm3    RBC 4.68 3.77 - 5.28 10*6/mm3    Hemoglobin 12.2 12.0 - 15.9 g/dL    Hematocrit 39.5 34.0 - 46.6 %    MCV 84.4 79.0 -  97.0 fL    MCH 26.1 (L) 26.6 - 33.0 pg    MCHC 30.9 (L) 31.5 - 35.7 g/dL    RDW 14.1 12.3 - 15.4 %    RDW-SD 43.4 37.0 - 54.0 fl    MPV 9.6 6.0 - 12.0 fL    Platelets 261 140 - 450 10*3/mm3    Neutrophil % 57.2 42.7 - 76.0 %    Lymphocyte % 29.8 19.6 - 45.3 %    Monocyte % 9.0 5.0 - 12.0 %    Eosinophil % 3.0 0.3 - 6.2 %    Basophil % 0.8 0.0 - 1.5 %    Immature Grans % 0.2 0.0 - 0.5 %    Neutrophils, Absolute 4.84 1.70 - 7.00 10*3/mm3    Lymphocytes, Absolute 2.52 0.70 - 3.10 10*3/mm3    Monocytes, Absolute 0.76 0.10 - 0.90 10*3/mm3    Eosinophils, Absolute 0.25 0.00 - 0.40 10*3/mm3    Basophils, Absolute 0.07 0.00 - 0.20 10*3/mm3    Immature Grans, Absolute 0.02 0.00 - 0.05 10*3/mm3    nRBC 0.0 0.0 - 0.2 /100 WBC   Green Top (Gel)   Result Value Ref Range    Extra Tube Hold for add-ons.    Lavender Top   Result Value Ref Range    Extra Tube hold for add-on    Gold Top - SST   Result Value Ref Range    Extra Tube Hold for add-ons.    Light Blue Top   Result Value Ref Range    Extra Tube Hold for add-ons.               Medical Decision Making  Imaging of the left ribs and left shoulder revealed no acute fracture or process. CT head without contrast unremarkable. CT chest noted T6 compression fracture which did not appear to be hyperacute. Relative states that patient obtained this fracture several months ago however she never received a brace. No other acute findings throughout ED workup. VSS. Patient awake/alert. No signs of distress. Patient stable for discharge from ED at this time with TSLO brace applied. Patient should follow up with PCP ASAP for post ED visit evaluation. She should also make an appointment with ortho (contact number provided). Discussed with patient and family member. Voiced agreement and understanding with no further questions or concerns at this time.     Problems Addressed:  Compression fracture of T6 vertebra, initial encounter: complicated acute illness or injury  Fall, initial encounter:  complicated acute illness or injury    Amount and/or Complexity of Data Reviewed  Labs: ordered.  Radiology: ordered. Decision-making details documented in ED Course.  ECG/medicine tests: ordered.    Risk  OTC drugs.  Prescription drug management.        Final diagnoses:   Compression fracture of T6 vertebra, initial encounter   Fall, initial encounter       ED Disposition  ED Disposition       ED Disposition   Discharge    Condition   Stable    Comment   --               Kreis, Samuel Duane, MD  [address] [place] [address] [address] [place] [id number] [phone]    Schedule an appointment as soon as possible for a visit in 1 day  ED follow up; fall, T6 compression fx although does not appear hyperacute    Sebastian Harrison DO  [address] [place] [address]  [phone]    Call in 1 day           Medication List      No changes were made to your prescriptions during this visit.            Kristina Rich, APRN  04/09/24 1308

## 2024-04-12 LAB
QT INTERVAL: 400 MS
QTC INTERVAL: 402 MS

## 2024-05-28 DIAGNOSIS — I48.0 PAROXYSMAL ATRIAL FIBRILLATION: ICD-10-CM

## 2024-05-28 RX ORDER — METOPROLOL TARTRATE 50 MG/1
50 TABLET, FILM COATED ORAL EVERY 12 HOURS
Qty: 180 TABLET | Refills: 0 | Status: SHIPPED | OUTPATIENT
Start: 2024-05-28

## 2024-06-09 DIAGNOSIS — E78.5 HYPERLIPIDEMIA LDL GOAL <70: ICD-10-CM

## 2024-06-10 DIAGNOSIS — E78.5 HYPERLIPIDEMIA LDL GOAL <70: ICD-10-CM

## 2024-06-10 RX ORDER — ATORVASTATIN CALCIUM 20 MG/1
20 TABLET, FILM COATED ORAL DAILY
Qty: 90 TABLET | Refills: 0 | Status: SHIPPED | OUTPATIENT
Start: 2024-06-10 | End: 2024-06-17

## 2024-06-11 RX ORDER — ATORVASTATIN CALCIUM 20 MG/1
20 TABLET, FILM COATED ORAL DAILY
Qty: 90 TABLET | Refills: 0 | OUTPATIENT
Start: 2024-06-11

## 2024-06-14 DIAGNOSIS — E78.5 HYPERLIPIDEMIA LDL GOAL <70: ICD-10-CM

## 2024-06-17 RX ORDER — ATORVASTATIN CALCIUM 20 MG/1
20 TABLET, FILM COATED ORAL DAILY
Qty: 90 TABLET | Refills: 0 | Status: SHIPPED | OUTPATIENT
Start: 2024-06-17

## 2024-10-02 ENCOUNTER — TELEPHONE (OUTPATIENT)
Dept: CARDIOLOGY | Facility: CLINIC | Age: 79
End: 2024-10-02
Payer: MEDICARE

## 2024-10-02 NOTE — TELEPHONE ENCOUNTER
No active with remote monitoring. Last obtained remote transmission was 06*09*2022. Made in active in remote clinic.    Detail Level: Detailed Detail Level: Generalized
